# Patient Record
Sex: MALE | Race: WHITE | NOT HISPANIC OR LATINO | Employment: OTHER | URBAN - METROPOLITAN AREA
[De-identification: names, ages, dates, MRNs, and addresses within clinical notes are randomized per-mention and may not be internally consistent; named-entity substitution may affect disease eponyms.]

---

## 2023-06-02 ENCOUNTER — APPOINTMENT (OUTPATIENT)
Dept: GENERAL RADIOLOGY | Facility: HOSPITAL | Age: 73
DRG: 270 | End: 2023-06-02
Payer: MEDICARE

## 2023-06-02 ENCOUNTER — HOSPITAL ENCOUNTER (INPATIENT)
Facility: HOSPITAL | Age: 73
LOS: 1 days | Discharge: OTHER INSTITUTION | DRG: 270 | End: 2023-06-02
Attending: EMERGENCY MEDICINE | Admitting: INTERNAL MEDICINE
Payer: MEDICARE

## 2023-06-02 VITALS
DIASTOLIC BLOOD PRESSURE: 100 MMHG | WEIGHT: 206.13 LBS | SYSTOLIC BLOOD PRESSURE: 140 MMHG | HEIGHT: 72 IN | HEART RATE: 91 BPM | RESPIRATION RATE: 20 BRPM | OXYGEN SATURATION: 91 % | BODY MASS INDEX: 27.92 KG/M2 | TEMPERATURE: 97.5 F

## 2023-06-02 DIAGNOSIS — I24.9 ACS (ACUTE CORONARY SYNDROME): Primary | ICD-10-CM

## 2023-06-02 PROBLEM — R07.9 CHEST PAIN: Status: ACTIVE | Noted: 2023-06-02

## 2023-06-02 LAB
ACT BLD: 173 SECONDS (ref 89–137)
ACT BLD: 191 SECONDS (ref 89–137)
ALBUMIN SERPL-MCNC: 4.1 G/DL (ref 3.5–5.2)
ALBUMIN/GLOB SERPL: 1.2 G/DL
ALP SERPL-CCNC: 86 U/L (ref 39–117)
ALT SERPL W P-5'-P-CCNC: 15 U/L (ref 1–41)
ANION GAP SERPL CALCULATED.3IONS-SCNC: 15.1 MMOL/L (ref 5–15)
ANISOCYTOSIS BLD QL: NORMAL
APTT PPP: 25.3 SECONDS (ref 78–95.9)
APTT PPP: 26.1 SECONDS (ref 24.2–34.2)
AST SERPL-CCNC: 19 U/L (ref 1–40)
BASOPHILS # BLD AUTO: 0.07 10*3/MM3 (ref 0–0.2)
BASOPHILS NFR BLD AUTO: 0.8 % (ref 0–1.5)
BILIRUB SERPL-MCNC: 0.9 MG/DL (ref 0–1.2)
BUN SERPL-MCNC: 26 MG/DL (ref 8–23)
BUN/CREAT SERPL: 30.6 (ref 7–25)
CALCIUM SPEC-SCNC: 9.5 MG/DL (ref 8.6–10.5)
CHLORIDE SERPL-SCNC: 98 MMOL/L (ref 98–107)
CO2 SERPL-SCNC: 21.9 MMOL/L (ref 22–29)
CREAT SERPL-MCNC: 0.85 MG/DL (ref 0.76–1.27)
D-LACTATE SERPL-SCNC: 0.9 MMOL/L (ref 0.5–2)
DEPRECATED RDW RBC AUTO: 45.4 FL (ref 37–54)
EGFRCR SERPLBLD CKD-EPI 2021: 92.9 ML/MIN/1.73
EOSINOPHIL # BLD AUTO: 0.06 10*3/MM3 (ref 0–0.4)
EOSINOPHIL NFR BLD AUTO: 0.7 % (ref 0.3–6.2)
ERYTHROCYTE [DISTWIDTH] IN BLOOD BY AUTOMATED COUNT: 18.4 % (ref 12.3–15.4)
GEN 5 2HR TROPONIN T REFLEX: 142 NG/L
GLOBULIN UR ELPH-MCNC: 3.3 GM/DL
GLUCOSE BLDC GLUCOMTR-MCNC: 102 MG/DL (ref 70–99)
GLUCOSE SERPL-MCNC: 257 MG/DL (ref 65–99)
HCT VFR BLD AUTO: 41 % (ref 37.5–51)
HGB BLD-MCNC: 12.6 G/DL (ref 13–17.7)
HOLD SPECIMEN: NORMAL
HOLD SPECIMEN: NORMAL
IMM GRANULOCYTES # BLD AUTO: 0.08 10*3/MM3 (ref 0–0.05)
IMM GRANULOCYTES NFR BLD AUTO: 0.9 % (ref 0–0.5)
INR PPP: 1.01 (ref 0.86–1.15)
LYMPHOCYTES # BLD AUTO: 0.71 10*3/MM3 (ref 0.7–3.1)
LYMPHOCYTES NFR BLD AUTO: 8.1 % (ref 19.6–45.3)
MAGNESIUM SERPL-MCNC: 2.2 MG/DL (ref 1.6–2.4)
MCH RBC QN AUTO: 22.6 PG (ref 26.6–33)
MCHC RBC AUTO-ENTMCNC: 30.7 G/DL (ref 31.5–35.7)
MCV RBC AUTO: 73.6 FL (ref 79–97)
MICROCYTES BLD QL: NORMAL
MONOCYTES # BLD AUTO: 0.64 10*3/MM3 (ref 0.1–0.9)
MONOCYTES NFR BLD AUTO: 7.3 % (ref 5–12)
NEUTROPHILS NFR BLD AUTO: 7.22 10*3/MM3 (ref 1.7–7)
NEUTROPHILS NFR BLD AUTO: 82.2 % (ref 42.7–76)
NRBC BLD AUTO-RTO: 0 /100 WBC (ref 0–0.2)
NT-PROBNP SERPL-MCNC: 1339 PG/ML (ref 0–900)
PHOSPHATE SERPL-MCNC: 3.9 MG/DL (ref 2.5–4.5)
PLATELET # BLD AUTO: 350 10*3/MM3 (ref 140–450)
PMV BLD AUTO: 9.4 FL (ref 6–12)
POTASSIUM SERPL-SCNC: 4.5 MMOL/L (ref 3.5–5.2)
PROCALCITONIN SERPL-MCNC: 0.04 NG/ML (ref 0–0.25)
PROT SERPL-MCNC: 7.4 G/DL (ref 6–8.5)
PROTHROMBIN TIME: 13.4 SECONDS (ref 11.8–14.9)
RBC # BLD AUTO: 5.57 10*6/MM3 (ref 4.14–5.8)
SMALL PLATELETS BLD QL SMEAR: ADEQUATE
SODIUM SERPL-SCNC: 135 MMOL/L (ref 136–145)
TROPONIN T DELTA: 19 NG/L
TROPONIN T SERPL HS-MCNC: 123 NG/L
WBC MORPH BLD: NORMAL
WBC NRBC COR # BLD: 8.78 10*3/MM3 (ref 3.4–10.8)
WHOLE BLOOD HOLD COAG: NORMAL
WHOLE BLOOD HOLD SPECIMEN: NORMAL

## 2023-06-02 PROCEDURE — 83605 ASSAY OF LACTIC ACID: CPT | Performed by: STUDENT IN AN ORGANIZED HEALTH CARE EDUCATION/TRAINING PROGRAM

## 2023-06-02 PROCEDURE — 85610 PROTHROMBIN TIME: CPT | Performed by: EMERGENCY MEDICINE

## 2023-06-02 PROCEDURE — 85730 THROMBOPLASTIN TIME PARTIAL: CPT | Performed by: INTERNAL MEDICINE

## 2023-06-02 PROCEDURE — 93458 L HRT ARTERY/VENTRICLE ANGIO: CPT | Performed by: INTERNAL MEDICINE

## 2023-06-02 PROCEDURE — 99285 EMERGENCY DEPT VISIT HI MDM: CPT

## 2023-06-02 PROCEDURE — 93005 ELECTROCARDIOGRAM TRACING: CPT

## 2023-06-02 PROCEDURE — 25010000002 AZITHROMYCIN PER 500 MG: Performed by: STUDENT IN AN ORGANIZED HEALTH CARE EDUCATION/TRAINING PROGRAM

## 2023-06-02 PROCEDURE — B2151ZZ FLUOROSCOPY OF LEFT HEART USING LOW OSMOLAR CONTRAST: ICD-10-PCS | Performed by: INTERNAL MEDICINE

## 2023-06-02 PROCEDURE — 93010 ELECTROCARDIOGRAM REPORT: CPT | Performed by: INTERNAL MEDICINE

## 2023-06-02 PROCEDURE — 87798 DETECT AGENT NOS DNA AMP: CPT

## 2023-06-02 PROCEDURE — 99153 MOD SED SAME PHYS/QHP EA: CPT | Performed by: INTERNAL MEDICINE

## 2023-06-02 PROCEDURE — 85730 THROMBOPLASTIN TIME PARTIAL: CPT | Performed by: EMERGENCY MEDICINE

## 2023-06-02 PROCEDURE — 84145 PROCALCITONIN (PCT): CPT | Performed by: STUDENT IN AN ORGANIZED HEALTH CARE EDUCATION/TRAINING PROGRAM

## 2023-06-02 PROCEDURE — C1894 INTRO/SHEATH, NON-LASER: HCPCS | Performed by: INTERNAL MEDICINE

## 2023-06-02 PROCEDURE — 87633 RESP VIRUS 12-25 TARGETS: CPT

## 2023-06-02 PROCEDURE — 82948 REAGENT STRIP/BLOOD GLUCOSE: CPT

## 2023-06-02 PROCEDURE — 25510000001 IOPAMIDOL PER 1 ML: Performed by: INTERNAL MEDICINE

## 2023-06-02 PROCEDURE — 84100 ASSAY OF PHOSPHORUS: CPT | Performed by: PHYSICIAN ASSISTANT

## 2023-06-02 PROCEDURE — 71045 X-RAY EXAM CHEST 1 VIEW: CPT

## 2023-06-02 PROCEDURE — 25010000002 MIDAZOLAM PER 1MG: Performed by: INTERNAL MEDICINE

## 2023-06-02 PROCEDURE — 4A023N7 MEASUREMENT OF CARDIAC SAMPLING AND PRESSURE, LEFT HEART, PERCUTANEOUS APPROACH: ICD-10-PCS | Performed by: INTERNAL MEDICINE

## 2023-06-02 PROCEDURE — 99223 1ST HOSP IP/OBS HIGH 75: CPT | Performed by: STUDENT IN AN ORGANIZED HEALTH CARE EDUCATION/TRAINING PROGRAM

## 2023-06-02 PROCEDURE — 33967 INSERT I-AORT PERCUT DEVICE: CPT | Performed by: INTERNAL MEDICINE

## 2023-06-02 PROCEDURE — 5A02210 ASSISTANCE WITH CARDIAC OUTPUT USING BALLOON PUMP, CONTINUOUS: ICD-10-PCS | Performed by: INTERNAL MEDICINE

## 2023-06-02 PROCEDURE — 80053 COMPREHEN METABOLIC PANEL: CPT | Performed by: EMERGENCY MEDICINE

## 2023-06-02 PROCEDURE — 85347 COAGULATION TIME ACTIVATED: CPT

## 2023-06-02 PROCEDURE — 25010000002 FENTANYL CITRATE (PF) 100 MCG/2ML SOLUTION: Performed by: INTERNAL MEDICINE

## 2023-06-02 PROCEDURE — 83880 ASSAY OF NATRIURETIC PEPTIDE: CPT | Performed by: EMERGENCY MEDICINE

## 2023-06-02 PROCEDURE — 85025 COMPLETE CBC W/AUTO DIFF WBC: CPT | Performed by: EMERGENCY MEDICINE

## 2023-06-02 PROCEDURE — 84484 ASSAY OF TROPONIN QUANT: CPT | Performed by: EMERGENCY MEDICINE

## 2023-06-02 PROCEDURE — 85007 BL SMEAR W/DIFF WBC COUNT: CPT | Performed by: EMERGENCY MEDICINE

## 2023-06-02 PROCEDURE — 93005 ELECTROCARDIOGRAM TRACING: CPT | Performed by: EMERGENCY MEDICINE

## 2023-06-02 PROCEDURE — 87581 M.PNEUMON DNA AMP PROBE: CPT

## 2023-06-02 PROCEDURE — 36415 COLL VENOUS BLD VENIPUNCTURE: CPT

## 2023-06-02 PROCEDURE — 25010000002 CEFTRIAXONE PER 250 MG: Performed by: STUDENT IN AN ORGANIZED HEALTH CARE EDUCATION/TRAINING PROGRAM

## 2023-06-02 PROCEDURE — 25010000002 HEPARIN (PORCINE) PER 1000 UNITS: Performed by: INTERNAL MEDICINE

## 2023-06-02 PROCEDURE — C1769 GUIDE WIRE: HCPCS | Performed by: INTERNAL MEDICINE

## 2023-06-02 PROCEDURE — 87486 CHLMYD PNEUM DNA AMP PROBE: CPT | Performed by: STUDENT IN AN ORGANIZED HEALTH CARE EDUCATION/TRAINING PROGRAM

## 2023-06-02 PROCEDURE — B2111ZZ FLUOROSCOPY OF MULTIPLE CORONARY ARTERIES USING LOW OSMOLAR CONTRAST: ICD-10-PCS | Performed by: INTERNAL MEDICINE

## 2023-06-02 PROCEDURE — 99152 MOD SED SAME PHYS/QHP 5/>YRS: CPT | Performed by: INTERNAL MEDICINE

## 2023-06-02 PROCEDURE — 83735 ASSAY OF MAGNESIUM: CPT | Performed by: PHYSICIAN ASSISTANT

## 2023-06-02 RX ORDER — NITROGLYCERIN 0.4 MG/1
0.4 TABLET SUBLINGUAL
Status: DISCONTINUED | OUTPATIENT
Start: 2023-06-02 | End: 2023-06-03 | Stop reason: HOSPADM

## 2023-06-02 RX ORDER — LOSARTAN POTASSIUM AND HYDROCHLOROTHIAZIDE 12.5; 1 MG/1; MG/1
1 TABLET ORAL DAILY
COMMUNITY
Start: 2023-05-20

## 2023-06-02 RX ORDER — SODIUM CHLORIDE 0.9 % (FLUSH) 0.9 %
10 SYRINGE (ML) INJECTION EVERY 12 HOURS SCHEDULED
Status: DISCONTINUED | OUTPATIENT
Start: 2023-06-02 | End: 2023-06-03 | Stop reason: HOSPADM

## 2023-06-02 RX ORDER — GLIPIZIDE 10 MG/1
10 TABLET ORAL
COMMUNITY

## 2023-06-02 RX ORDER — AMOXICILLIN 250 MG
2 CAPSULE ORAL 2 TIMES DAILY
Status: DISCONTINUED | OUTPATIENT
Start: 2023-06-02 | End: 2023-06-03 | Stop reason: HOSPADM

## 2023-06-02 RX ORDER — SODIUM CHLORIDE 0.9 % (FLUSH) 0.9 %
10 SYRINGE (ML) INJECTION AS NEEDED
Status: DISCONTINUED | OUTPATIENT
Start: 2023-06-02 | End: 2023-06-03 | Stop reason: HOSPADM

## 2023-06-02 RX ORDER — FENTANYL CITRATE 50 UG/ML
INJECTION, SOLUTION INTRAMUSCULAR; INTRAVENOUS
Status: DISCONTINUED | OUTPATIENT
Start: 2023-06-02 | End: 2023-06-02 | Stop reason: HOSPADM

## 2023-06-02 RX ORDER — DAPAGLIFLOZIN 10 MG/1
1 TABLET, FILM COATED ORAL DAILY
COMMUNITY
Start: 2023-05-22

## 2023-06-02 RX ORDER — SODIUM CHLORIDE 9 MG/ML
INJECTION, SOLUTION INTRAVENOUS
Status: COMPLETED | OUTPATIENT
Start: 2023-06-02 | End: 2023-06-02

## 2023-06-02 RX ORDER — TAMSULOSIN HYDROCHLORIDE 0.4 MG/1
0.4 CAPSULE ORAL DAILY
Status: DISCONTINUED | OUTPATIENT
Start: 2023-06-02 | End: 2023-06-03 | Stop reason: HOSPADM

## 2023-06-02 RX ORDER — ROSUVASTATIN CALCIUM 20 MG/1
40 TABLET, COATED ORAL NIGHTLY
Status: DISCONTINUED | OUTPATIENT
Start: 2023-06-02 | End: 2023-06-03 | Stop reason: HOSPADM

## 2023-06-02 RX ORDER — VERAPAMIL HYDROCHLORIDE 2.5 MG/ML
INJECTION, SOLUTION INTRAVENOUS
Status: DISCONTINUED | OUTPATIENT
Start: 2023-06-02 | End: 2023-06-02 | Stop reason: HOSPADM

## 2023-06-02 RX ORDER — AMLODIPINE BESYLATE 5 MG/1
5 TABLET ORAL DAILY
COMMUNITY

## 2023-06-02 RX ORDER — FAMOTIDINE 10 MG/ML
20 INJECTION, SOLUTION INTRAVENOUS ONCE
Status: COMPLETED | OUTPATIENT
Start: 2023-06-02 | End: 2023-06-02

## 2023-06-02 RX ORDER — POLYETHYLENE GLYCOL 3350 17 G/17G
17 POWDER, FOR SOLUTION ORAL DAILY PRN
Status: DISCONTINUED | OUTPATIENT
Start: 2023-06-02 | End: 2023-06-03 | Stop reason: HOSPADM

## 2023-06-02 RX ORDER — CEFTRIAXONE SODIUM 1 G/50ML
1 INJECTION, SOLUTION INTRAVENOUS EVERY 24 HOURS
Status: DISCONTINUED | OUTPATIENT
Start: 2023-06-02 | End: 2023-06-03 | Stop reason: HOSPADM

## 2023-06-02 RX ORDER — ACETAMINOPHEN 325 MG/1
650 TABLET ORAL EVERY 4 HOURS PRN
Status: DISCONTINUED | OUTPATIENT
Start: 2023-06-02 | End: 2023-06-03 | Stop reason: HOSPADM

## 2023-06-02 RX ORDER — ONDANSETRON 4 MG/1
4 TABLET, FILM COATED ORAL EVERY 6 HOURS PRN
Status: DISCONTINUED | OUTPATIENT
Start: 2023-06-02 | End: 2023-06-03 | Stop reason: HOSPADM

## 2023-06-02 RX ORDER — METOPROLOL SUCCINATE 25 MG/1
25 TABLET, EXTENDED RELEASE ORAL
Status: DISCONTINUED | OUTPATIENT
Start: 2023-06-02 | End: 2023-06-03 | Stop reason: HOSPADM

## 2023-06-02 RX ORDER — HEPARIN SOD,PORCINE/0.9 % NACL 25000/250
12 INTRAVENOUS SOLUTION INTRAVENOUS
Status: DISCONTINUED | OUTPATIENT
Start: 2023-06-02 | End: 2023-06-02

## 2023-06-02 RX ORDER — ASPIRIN 81 MG/1
81 TABLET ORAL DAILY
Status: DISCONTINUED | OUTPATIENT
Start: 2023-06-02 | End: 2023-06-03 | Stop reason: HOSPADM

## 2023-06-02 RX ORDER — ENOXAPARIN SODIUM 100 MG/ML
40 INJECTION SUBCUTANEOUS NIGHTLY
Status: DISCONTINUED | OUTPATIENT
Start: 2023-06-02 | End: 2023-06-02 | Stop reason: SDUPTHER

## 2023-06-02 RX ORDER — CALCIUM CARBONATE 500 MG/1
1 TABLET, CHEWABLE ORAL 2 TIMES DAILY PRN
Status: DISCONTINUED | OUTPATIENT
Start: 2023-06-02 | End: 2023-06-03 | Stop reason: HOSPADM

## 2023-06-02 RX ORDER — TAMSULOSIN HYDROCHLORIDE 0.4 MG/1
1 CAPSULE ORAL DAILY
COMMUNITY

## 2023-06-02 RX ORDER — LIDOCAINE HYDROCHLORIDE 20 MG/ML
INJECTION, SOLUTION INFILTRATION; PERINEURAL
Status: DISCONTINUED | OUTPATIENT
Start: 2023-06-02 | End: 2023-06-02 | Stop reason: HOSPADM

## 2023-06-02 RX ORDER — SODIUM CHLORIDE 9 MG/ML
40 INJECTION, SOLUTION INTRAVENOUS AS NEEDED
Status: DISCONTINUED | OUTPATIENT
Start: 2023-06-02 | End: 2023-06-03 | Stop reason: HOSPADM

## 2023-06-02 RX ORDER — BISACODYL 5 MG/1
5 TABLET, DELAYED RELEASE ORAL DAILY PRN
Status: DISCONTINUED | OUTPATIENT
Start: 2023-06-02 | End: 2023-06-03 | Stop reason: HOSPADM

## 2023-06-02 RX ORDER — HEPARIN SODIUM 1000 [USP'U]/ML
INJECTION, SOLUTION INTRAVENOUS; SUBCUTANEOUS
Status: DISCONTINUED | OUTPATIENT
Start: 2023-06-02 | End: 2023-06-02 | Stop reason: HOSPADM

## 2023-06-02 RX ORDER — MIDAZOLAM HYDROCHLORIDE 2 MG/2ML
INJECTION, SOLUTION INTRAMUSCULAR; INTRAVENOUS
Status: DISCONTINUED | OUTPATIENT
Start: 2023-06-02 | End: 2023-06-02 | Stop reason: HOSPADM

## 2023-06-02 RX ORDER — ACETAMINOPHEN 325 MG/1
650 TABLET ORAL EVERY 4 HOURS PRN
Status: DISCONTINUED | OUTPATIENT
Start: 2023-06-02 | End: 2023-06-02 | Stop reason: SDUPTHER

## 2023-06-02 RX ORDER — HEPARIN SOD,PORCINE/0.9 % NACL 25000/250
12 INTRAVENOUS SOLUTION INTRAVENOUS
Status: DISCONTINUED | OUTPATIENT
Start: 2023-06-02 | End: 2023-06-03 | Stop reason: HOSPADM

## 2023-06-02 RX ORDER — BISACODYL 10 MG
10 SUPPOSITORY, RECTAL RECTAL DAILY PRN
Status: DISCONTINUED | OUTPATIENT
Start: 2023-06-02 | End: 2023-06-03 | Stop reason: HOSPADM

## 2023-06-02 RX ADMIN — FAMOTIDINE 20 MG: 10 INJECTION INTRAVENOUS at 22:20

## 2023-06-02 RX ADMIN — Medication 10 ML: at 21:48

## 2023-06-02 RX ADMIN — SODIUM CHLORIDE 500 MG: 9 INJECTION, SOLUTION INTRAVENOUS at 17:58

## 2023-06-02 RX ADMIN — CEFTRIAXONE SODIUM 1 G: 1 INJECTION, SOLUTION INTRAVENOUS at 17:58

## 2023-06-02 RX ADMIN — HEPARIN SODIUM 12 UNITS/KG/HR: 5000 INJECTION INTRAVENOUS; SUBCUTANEOUS at 22:14

## 2023-06-02 NOTE — H&P
[facility]   HOSPITALIST HISTORY AND PHYSICAL / DISCHARGE   Date: 2023   Patient Name: Woodrow Garcia  : 1950  MRN: [id number]  Primary Care Physician:  Terence Whitaker MD  Date of admission: 2023    Subjective   Subjective   CC:   Shortness of breath    HPI:    Woodrow Garcia is a 73 y.o. male a past medical history of diabetes, hypertension.  Patient presents the emergency department with 3 weeks of worsening shortness of breath.  Patient went to his primary care physician's office today, PCP concerned having an acute MI after looking at an EKG, therefore sent to ED via ambulance.  In the emergency department patient noted had elevated troponin given symptoms and lab findings patient was taken immediately to the Cath Lab.  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  Patient is being transferred to [facility] in [facility] for additional work-up.  Patient came out of the Cath Lab at [facility] with a balloon pump.  Patient in stable condition prior to discharge    Personal History     Past Medical History:  Diabetes  Hypertension    Past Surgical History:  No surgical Hx    Family History:   Father with cardiac disease, passed in his 80s  Mother alive with Alzheimers     Social History:   Never smoker  Non drinker  No other drug use     Home Medications:  amLODIPine, dapagliflozin Propanediol, glipizide, losartan-hydrochlorothiazide, metFORMIN, and tamsulosin    Allergies:  No Known Allergies    Review of Systems   All systems were reviewed and negative except for: denies any chest pain and no dyspnea     Objective   Objective     Vitals:   Temp:  [98.2 °F (36.8 °C)] 98.2 °F (36.8 °C)  Heart Rate:  [104-124] 104  Resp:  [28] 28  BP: (119-140)/(72-95) 119/72  Flow (L/min):  [2] 2    Physical Exam    Constitutional: Awake, alert, no acute distress   Eyes: Pupils equal, sclerae anicteric, no conjunctival injection   HENT: NCAT, mucous membranes  moist   Neck: Supple, no thyromegaly, no lymphadenopathy, trachea midline   Respiratory: Clear to auscultation bilaterally, nonlabored respirations    Cardiovascular: RRR, systolic murmurs, rubs, or gallops, palpable pedal pulses bilaterally   Gastrointestinal: Positive bowel sounds, soft, nontender, nondistended   Musculoskeletal: No bilateral ankle edema, no clubbing or cyanosis to extremities    Neurologic: Oriented x 3, strength symmetric in all extremities, Cranial Nerves grossly intact to confrontation, speech clear    Result Review    Result Review:  I have personally reviewed the results from the time of this admission to 6/2/2023 14:18 EDT and agree with these findings:  []  Laboratory  []  Microbiology  []  Radiology  []  EKG/Telemetry   []  Cardiology/Vascular   []  Pathology  []  Old records  []  Other:      Assessment & Plan   Assessment / Plan     Assessment/Plan:  NSTEMI  LAD CAD on cath   Hypertension  Diabetes    Plan:  Patient was taken to the Cath Lab with cardiology, found to have left main disease and therefore will need to be considered for CABG.  Balloon pump was placed and patient was post procedurally taken to the ICU for close monitoring.  Patient will be transferred to [facility] in the [place], [place] for definitive therapy, CABG.  Dr. Wright is the cardiologist aware.   is accepting physician in the ICU.      DVT prophylaxis:  Medical DVT prophylaxis orders are present.    CODE STATUS:    Code Status (Patient has no pulse and is not breathing): CPR (Attempt to Resuscitate)  Medical Interventions (Patient has pulse or is breathing): Full Support    Electronically signed by Ned Monzon MD, 06/02/23, 2:18 PM EDT.

## 2023-06-02 NOTE — CONSULTS
[facility]   Cardiology Consult Note    Patient Name: Woodrow Garcia  : 1950  MRN: [id number]  Primary Care Physician:  Terence Whitaker MD  Referring Physician: No ref. provider found  Date of admission: 2023    Subjective   Subjective     Reason for Consult/ Chief Complaint: Shortness of breath    HPI:  Woodrow Garcia is a 73 y.o. male with past medical history significant for hypertension and diabetes, was sent to the emergency room by his primary care provider with abnormal ECG.  The patient has been having progressive shortness of breath which started about 3 weeks ago.  Lately, he is unable to walk more than 100 feet without having to stop due to shortness of breath and general fatigue.  He denies chest discomfort per se.  He has no palpitations, dizziness, presyncope or syncope.  He has no fever, chills or cough.  He is normally able to hike and walk a far distance without stopping.  He has no previous cardiac history.  He was noted to have abnormal ECG concerning for myocardial ischemia.  He has a rising troponin consistent with non-STEMI.  We were called for further evaluation and treatment.    Review of Systems   All systems were reviewed and negative except as above.      Personal History     Past Medical History:   Diagnosis Date   • Diabetes    • Enlarged prostate    • Hypertension         Family History: family history is not on file. Otherwise pertinent FHx was reviewed and not pertinent to current issue.    Social History:  reports that he has never smoked. He does not have any smokeless tobacco history on file. He reports that he does not drink alcohol and does not use drugs.    Home Medications:  amLODIPine, dapagliflozin Propanediol, glipizide, losartan-hydrochlorothiazide, metFORMIN, and tamsulosin    Allergies:  No Known Allergies    Objective    Objective     Vitals:   Temp:  [98.2 °F (36.8 °C)] 98.2 °F (36.8 °C)  Heart Rate:  [104-124] 104  Resp:  [28] 28  BP:  (119-140)/(72-95) 119/72  Flow (L/min):  [2] 2      Physical Exam:   Constitutional: Awake, alert, No acute distress    Eyes: PERRLA, sclerae anicteric, no conjunctival injection   HENT: NCAT, mucous membranes moist   Neck: Supple, no thyromegaly, no lymphadenopathy, trachea midline   Respiratory: Clear to auscultation bilaterally, nonlabored respirations    Cardiovascular: RRR, no murmurs, rubs, or gallops, palpable pedal pulses bilaterally   Gastrointestinal: Positive bowel sounds, soft, nontender, nondistended   Musculoskeletal: No bilateral ankle edema, no clubbing or cyanosis to extremities   Psychiatric: Appropriate affect, cooperative   Neurologic: Oriented x 3, strength symmetric in all extremities, Cranial Nerves grossly intact to confrontation, speech clear   Skin: No rashes     Result Review    Result Review:  I have personally reviewed the results from the time of this admission to 6/2/2023 13:56 EDT and agree with these findings:  [x]  Laboratory  []  Microbiology  [x]  Radiology  [x]  EKG/Telemetry   [x]  Cardiology/Vascular   []  Pathology  [x]  Old records  []  Other:  Most notable findings include:     CMP        6/2/2023    09:44   CMP   Glucose 257     BUN 26     Creatinine 0.85     EGFR 92.9     Sodium 135     Potassium 4.5     Chloride 98     Calcium 9.5     Total Protein 7.4     Albumin 4.1     Globulin 3.3     Total Bilirubin 0.9     Alkaline Phosphatase 86     AST (SGOT) 19     ALT (SGPT) 15     Albumin/Globulin Ratio 1.2     BUN/Creatinine Ratio 30.6     Anion Gap 15.1        CBC        6/2/2023    09:44   CBC   WBC 8.78     RBC 5.57     Hemoglobin 12.6     Hematocrit 41.0     MCV 73.6     MCH 22.6     MCHC 30.7     RDW 18.4     Platelets 350        Lab Results   Component Value Date    TROPONINT 142 (C) 06/02/2023         Assessment & Plan   Assessment / Plan     Brief Patient Summary:  Woodrow Garcia is a 73 y.o. male with:    -Progressive exertional dyspnea and exercise intolerance with  abnormal ECG and elevated troponin consistent with non-STEMI  -Hypertension  -Diabetes    Plan:   We will proceed with cardiac catheterization and possible intervention.  Risks versus benefits of the procedure were discussed with the patient and he was agreeable to proceed  Resume home medications for hypertension and diabetes  Further recommendations to follow    Thank you for the consultation    Electronically signed by Pito Brannon MD, 06/02/23, 1:56 PM EDT.

## 2023-06-02 NOTE — Clinical Note
The DP pulses are +2 bilaterally. The PT pulses are +2 bilaterally. The right radial pulse is +2. The femoral pulses are +2 bilaterally.

## 2023-06-02 NOTE — CONSULTS
Pulmonary / Critical Care Consult Note      Patient Name: Woodrow Garcia  : 1950  MRN: [id number]  Primary Care Physician:  Terence Whitaker MD  Referring Physician: Ned Monzon MD  Date of admission: 2023    Subjective   Subjective     Reason for Consult/ Chief Complaint:   Cardiogenic shock    HPI:  Woodrow Garcia is a 73 y.o. male with history of diabetes, hypertension presented to the ED with shortness of breath and chest discomfort.  In the ED patient was found to have elevated troponin and cardiology team took patient to the Cath Lab for evaluation.  Patient was found to have left main disease and will need CABG.  A balloon pump was placed preemptively by Dr. Brannon with the intent of patient being transferred to [facility] in [facility] for additional work-up and intervention.  In the ICU patient is hemodynamically stable.  He is on 4 L nasal cannula with SPO2 in the low 90s.  Patient denies smoking, tobacco use, drug use ever.  He reports being relatively healthy about a month ago where he was able to do hiking with his wife.  For the last week or so he can only walk up to 100 feet or so without getting shortness of breath.    Review of Systems  Constitutional symptoms:  Denied complaints   Ear, nose, throat: Denied complaints  Cardiovascular:  Denied complaints  Respiratory: +shortness of breath; +dyspnea on exertion; Denied complaints  Gastrointestinal: Denied complaints  Musculoskeletal: Denied complaints  Genitourinary: Denied complaints  Allergy / Immunology: Denied complaints  Hematologic: Denied complaints  Neurologic: Denied complaints  Skin: Denied complaints  Endocrine: Denied complaints  Psychiatric: Denied complaints      Personal History     Past Medical History:   Diagnosis Date   • Diabetes    • Enlarged prostate    • Hypertension        History reviewed. No pertinent surgical history.    Family History: family history is not on file. Otherwise pertinent FHx  Recorded as Task  Date: 05/15/2018 02:00 PM, Created By: TYRA TOM  Task Name: Nurse Order  Assigned To: Lindsey Gomez  Regarding Patient: PHILOMENA CELIS, Status: Active  Comment:   TYRA TOM - 15 May 2018 2:00 PM    TASK CREATED    please call patient.  She told me yesterday that her diet RC cola did not have any caffeine in it.  I looked it up after she left.  It has an excessive amount of caffeine, nearly as much as mountain dew.  She needs to stop drinking soda, tea, diet soda, etc.   water should be her beverage of choice.     She is having a lot of difficulty with limb movement/restless legs and this will help significantly.       Thanks.  Lindsey Lund - 15 May 2018 2:57 PM    TASK EDITED  Pt notified of orders and verbalized understanding.      Electronically signed by:Lindsey Gomez R.N.  May 15 2018  2:57PM CST     was reviewed and not pertinent to current issue.    Social History:  reports that he has never smoked. He does not have any smokeless tobacco history on file. He reports that he does not drink alcohol and does not use drugs.    Home Medications:  amLODIPine, dapagliflozin Propanediol, glipizide, losartan-hydrochlorothiazide, metFORMIN, and tamsulosin    Allergies:  No Known Allergies    Objective    Objective     Vitals:   Temp:  [98.2 °F (36.8 °C)] 98.2 °F (36.8 °C)  Heart Rate:  [104-124] 104  Resp:  [28] 28  BP: (119-140)/(72-95) 119/72  Flow (L/min):  [2] 2    Physical Exam:  Vital Signs Reviewed   General:  WDWN, Alert, NAD. Lying in bed   HEENT:  PERRL, EOMI.  OP, nares clear  Neck:  Supple, no JVD, no thyromegaly  Chest:  clear to auscultation bilaterally, no work of breathing noted on 4L NC  CV: RRR, no MGR, pulses 2+, equal.  Abd:  Soft, NT, ND, + BS, no HSM  EXT:  no clubbing, no cyanosis, no edema  Neuro:  A&Ox3, CN grossly intact, no focal deficits.  Skin: No rashes or lesions noted      Result Review    Result Review:  I have personally reviewed the results from the time of this admission to 6/2/2023 16:49 EDT and agree with these findings:  []  Laboratory  []  Microbiology  []  Radiology  []  EKG/Telemetry   []  Cardiology/Vascular   []  Pathology  []  Old records  []  Other:  Most notable findings include:   Troponin 123  proBNP 1339  Creatinine 0.89    Assessment & Plan   Assessment / Plan     Active Hospital Problems:  Active Hospital Problems    Diagnosis    • **Chest pain      Impression:  Severe CAD   NSTEMI  Balloon pump placement  Hypoxic respiratory failure requiring oxygen supplementation  Multifocal pneumonia, unspecified organism  History of hypertension  History of diabetes    Plan:  -Currently on 4 L nasal cannula continue to wean as tolerated keep SPO2 greater 90%;   -Chest x-ray showed right-sided opacification concerning for pneumonia; given hypoxia, will treat for now; will check  Pro-Wai, respiratory viral panel, lactic acid  -Right femoral balloon pump in place, management per cardiology  -Continue aspirin, statin, beta-blocker per cardiology   -Cont aspiration precautions  -Replace electrolytes PRN to keep K 4.0, Mag 2.0, Phos 4.0.  -Keep glucose 140-180 while in ICU. Cont SSI.  -DVT ppx: Lovenox  -GI ppx: None needed at this time  -Lines: PIVs  -Pending transfer to [facility] for higher level care    DVT prophylaxis:  Medical DVT prophylaxis orders are present.     Code Status and Medical Interventions:   Ordered at: 06/02/23 1418     Code Status (Patient has no pulse and is not breathing):    CPR (Attempt to Resuscitate)     Medical Interventions (Patient has pulse or is breathing):    Full Support      Electronically signed by Neisha Salas MD, 06/02/23, 4:49 PM EDT.

## 2023-06-02 NOTE — Clinical Note
IABP inserted in the right groin. suture and transparent dressing used to secure. IABP verified with fluoroscopy.Ratio: 1:1.Augmented pressure (mmHg): 113.

## 2023-06-02 NOTE — PLAN OF CARE
Goal Outcome Evaluation:      Goals of care discussed with: Patient and family    Progress: Ongoing, progressing    Outcome: Pt is A&Ox4, Sinus Tach 120's, NC @ 4L (SPO2@ 91%), pt to be tx to [facility] for CABG. IABP functioning properly at a 1:1 ratio. Incision sites WNL. Pulses palpable.

## 2023-06-02 NOTE — H&P (VIEW-ONLY)
[facility]   Cardiology Consult Note    Patient Name: Woodrow Garcia  : 1950  MRN: [id number]  Primary Care Physician:  Terence Whitaker MD  Referring Physician: No ref. provider found  Date of admission: 2023    Subjective   Subjective     Reason for Consult/ Chief Complaint: Shortness of breath    HPI:  Woodrow Garcia is a 73 y.o. male with past medical history significant for hypertension and diabetes, was sent to the emergency room by his primary care provider with abnormal ECG.  The patient has been having progressive shortness of breath which started about 3 weeks ago.  Lately, he is unable to walk more than 100 feet without having to stop due to shortness of breath and general fatigue.  He denies chest discomfort per se.  He has no palpitations, dizziness, presyncope or syncope.  He has no fever, chills or cough.  He is normally able to hike and walk a far distance without stopping.  He has no previous cardiac history.  He was noted to have abnormal ECG concerning for myocardial ischemia.  He has a rising troponin consistent with non-STEMI.  We were called for further evaluation and treatment.    Review of Systems   All systems were reviewed and negative except as above.      Personal History     Past Medical History:   Diagnosis Date   • Diabetes    • Enlarged prostate    • Hypertension         Family History: family history is not on file. Otherwise pertinent FHx was reviewed and not pertinent to current issue.    Social History:  reports that he has never smoked. He does not have any smokeless tobacco history on file. He reports that he does not drink alcohol and does not use drugs.    Home Medications:  amLODIPine, dapagliflozin Propanediol, glipizide, losartan-hydrochlorothiazide, metFORMIN, and tamsulosin    Allergies:  No Known Allergies    Objective    Objective     Vitals:   Temp:  [98.2 °F (36.8 °C)] 98.2 °F (36.8 °C)  Heart Rate:  [104-124] 104  Resp:  [28] 28  BP:  (119-140)/(72-95) 119/72  Flow (L/min):  [2] 2      Physical Exam:   Constitutional: Awake, alert, No acute distress    Eyes: PERRLA, sclerae anicteric, no conjunctival injection   HENT: NCAT, mucous membranes moist   Neck: Supple, no thyromegaly, no lymphadenopathy, trachea midline   Respiratory: Clear to auscultation bilaterally, nonlabored respirations    Cardiovascular: RRR, no murmurs, rubs, or gallops, palpable pedal pulses bilaterally   Gastrointestinal: Positive bowel sounds, soft, nontender, nondistended   Musculoskeletal: No bilateral ankle edema, no clubbing or cyanosis to extremities   Psychiatric: Appropriate affect, cooperative   Neurologic: Oriented x 3, strength symmetric in all extremities, Cranial Nerves grossly intact to confrontation, speech clear   Skin: No rashes     Result Review    Result Review:  I have personally reviewed the results from the time of this admission to 6/2/2023 13:56 EDT and agree with these findings:  [x]  Laboratory  []  Microbiology  [x]  Radiology  [x]  EKG/Telemetry   [x]  Cardiology/Vascular   []  Pathology  [x]  Old records  []  Other:  Most notable findings include:     CMP        6/2/2023    09:44   CMP   Glucose 257     BUN 26     Creatinine 0.85     EGFR 92.9     Sodium 135     Potassium 4.5     Chloride 98     Calcium 9.5     Total Protein 7.4     Albumin 4.1     Globulin 3.3     Total Bilirubin 0.9     Alkaline Phosphatase 86     AST (SGOT) 19     ALT (SGPT) 15     Albumin/Globulin Ratio 1.2     BUN/Creatinine Ratio 30.6     Anion Gap 15.1        CBC        6/2/2023    09:44   CBC   WBC 8.78     RBC 5.57     Hemoglobin 12.6     Hematocrit 41.0     MCV 73.6     MCH 22.6     MCHC 30.7     RDW 18.4     Platelets 350        Lab Results   Component Value Date    TROPONINT 142 (C) 06/02/2023         Assessment & Plan   Assessment / Plan     Brief Patient Summary:  Woodrow Garcia is a 73 y.o. male with:    -Progressive exertional dyspnea and exercise intolerance with  abnormal ECG and elevated troponin consistent with non-STEMI  -Hypertension  -Diabetes    Plan:   We will proceed with cardiac catheterization and possible intervention.  Risks versus benefits of the procedure were discussed with the patient and he was agreeable to proceed  Resume home medications for hypertension and diabetes  Further recommendations to follow    Thank you for the consultation    Electronically signed by Pito Brannon MD, 06/02/23, 1:56 PM EDT.

## 2023-06-02 NOTE — ED PROVIDER NOTES
Time: 11:11 AM EDT  Date of encounter:  6/2/2023  Independent Historian/Clinical History and Information was obtained by:   Patient  Chief Complaint: SOB    History is limited by: N/A    History of Present Illness:  Patient is a 73 y.o. year old male who presents to the emergency department for evaluation of SOB. About 3 weeks ago he started getting SOB very easily. Today he went to see his PCP regarding his shortness of air with exertion, and his PCP said he is having an acute MI after a concerning EKG. patient does admit to some tightness in his chest when he gets short of air.  He has had high blood pressure for 3 years or so, and has been seeing the PCP every 6 months to check up on his heart. He denies any pain in chest or elsewhere. He is not on oxygen at home. He states that he can only walk about 100 feet without having SOB.  This and is an acute change in his exercise capacity.    HPI    Patient Care Team  Primary Care Provider: Terence Whitaker MD    Past Medical History:     No Known Allergies  Past Medical History:   Diagnosis Date    Diabetes     Enlarged prostate     Hypertension      History reviewed. No pertinent surgical history.  History reviewed. No pertinent family history.    Home Medications:  Prior to Admission medications    Medication Sig Start Date End Date Taking? Authorizing Provider   amLODIPine (NORVASC) 5 MG tablet Take 1 tablet by mouth Daily.    ProviderLatanya MD   dapagliflozin-metformin HCl ER (XIGDUO XR)  MG tablet Take 1 tablet by mouth Daily.    Latanya Bejarano MD   glipizide (GLUCOTROL) 10 MG tablet Take 1 tablet by mouth 2 (Two) Times a Day Before Meals.    Latanya Bejarano MD   losartan 100 MG tablet 100 mg, amLODIPine 10 MG tablet 10 mg, hydroCHLOROthiazide 12.5 MG 12.5 mg Take  by mouth.    Latanya Bejarano MD   metFORMIN (GLUCOPHAGE) 1000 MG tablet Take 1 tablet by mouth 2 (Two) Times a Day With Meals.    Latanya Bejarano MD   tamsulosin  "(FLOMAX) 0.4 MG capsule 24 hr capsule Take 1 capsule by mouth Daily.    Provider, Historical, MD        Social History:   Social History     Tobacco Use    Smoking status: Never     Passive exposure: Never    Smokeless tobacco: Never   Vaping Use    Vaping Use: Never used   Substance Use Topics    Alcohol use: Never    Drug use: Never         Review of Systems:  Review of Systems   Constitutional:  Negative for chills and fever.   HENT:  Negative for congestion, ear pain and sore throat.    Eyes:  Negative for pain.   Respiratory:  Positive for shortness of breath. Negative for cough and chest tightness.    Cardiovascular:  Negative for chest pain.   Gastrointestinal:  Negative for abdominal pain, diarrhea, nausea and vomiting.   Genitourinary:  Negative for flank pain and hematuria.   Musculoskeletal:  Negative for joint swelling.   Skin:  Negative for pallor.   Neurological:  Negative for seizures and headaches.   All other systems reviewed and are negative.     Physical Exam:  /100   Pulse 91   Temp 97.5 °F (36.4 °C) (Oral)   Resp 20   Ht 182.9 cm (72\")   Wt 93.5 kg (206 lb 2.1 oz)   SpO2 91%   BMI 27.96 kg/m²     Physical Exam  Vitals and nursing note reviewed.   Constitutional:       General: He is not in acute distress.     Appearance: Normal appearance. He is not toxic-appearing.   HENT:      Head: Normocephalic and atraumatic.      Mouth/Throat:      Mouth: Mucous membranes are moist.   Eyes:      General: No scleral icterus.  Cardiovascular:      Rate and Rhythm: Regular rhythm. Tachycardia present.      Pulses: Normal pulses.      Heart sounds: Normal heart sounds.   Pulmonary:      Effort: Pulmonary effort is normal. No respiratory distress.      Breath sounds: Normal breath sounds.   Abdominal:      General: Abdomen is flat.      Palpations: Abdomen is soft.      Tenderness: There is no abdominal tenderness.   Musculoskeletal:         General: Normal range of motion.      Cervical back: " Normal range of motion and neck supple.   Skin:     General: Skin is warm and dry.   Neurological:      Mental Status: He is alert and oriented to person, place, and time. Mental status is at baseline.                Procedures:  Procedures      Medical Decision Making:      Comorbidities that affect care:    Diabetes, Hypertension    External Notes reviewed:    Previous Clinic Note: Office visit from his primary care provider from earlier today was reviewed by me.      The following orders were placed and all results were independently analyzed by me:  Orders Placed This Encounter   Procedures    Respiratory Panel, PCR (WITHOUT COVID) - Swab, Nasopharynx    XR Chest 1 View    [place] Draw    Comprehensive Metabolic Panel    BNP    Single High Sensitivity Troponin T    CBC Auto Differential    Scan Slide    High Sensitivity Troponin T 2Hr    Protime-INR    aPTT    Procalcitonin    Lactic Acid, Plasma    Magnesium    Phosphorus    aPTT    Undress & Gown    Vital Signs    Please Call and Notify Pharmacy of Order to Hold Apixaban, Rivaroxaban, Edoxaban, Dabigatran, Vorapaxar, Atopaxar 48 Hours Before Scheduled Cardiac Procedure    Please Call and Notify Pharmacy of Order to Hold Metformin (including any combination product containing Metformin) 48 Hours Before Scheduled Cardiac Procedure    Cardiology (on-call MD unless specified)    POC Activated Clotting Time    POC Activated Clotting Time    POC Glucose Once    ECG 12 Lead ED Triage Standing Order; SOA    Inpatient Admission    Transfer Patient    Discharge patient    CBC & Differential    Green Top (Gel)    Lavender Top    Gold Top - SST    Light Blue Top       Medications Given in the Emergency Department:  Medications   sodium chloride 0.9 % infusion (75 mL/hr Intravenous New Bag 6/2/23 1418)   famotidine (PEPCID) injection 20 mg (20 mg Intravenous Given 6/2/23 2220)        ED Course:    ED Course as of 06/03/23 1950   Sat Jun 03, 2023   1943 EKG performed at 946  was interpreted by me shows sinus tachycardia with a ventricular rate of 118 bpm.  The parable is 154 ms.  P waves are normal.  QRS interval is normal.  Axis is at 29 degrees.  Patient does not have some ST segment elevation changes in V1 and V2 as well as ST depression changes of the 5 and V6 along with 1 and aVL.  These changes were compared with the prior EKG dated 12/26/2018.  These changes suggest acute ischemia when compared to old EKG. [TB]      ED Course User Index  [TB] Carlo Jimenez DO     The patient was seen and evaluated in the ED by me.  The above history and physical examination was performed as documented.  Diagnostic data was obtained.  Results were reviewed.  Findings were discussed with the patient and his wife.  Patient does not show evidence of an acute ST elevation MI however there is EKG changes along with an abnormal troponin concerning for the likelihood of ACS.  I did discuss this with the patient.  The patient was very dismissive of my concerns.  Patient was however agreeable to admission.  I did discuss case with Dr. Brannon who recommended starting the patient on a heparin drip.  He has the patient admitted to the hospital service.  He was scheduled for heart catheterization later this afternoon.  Patient is aware the treatment plan.  Patient's agreeable to admission.    Labs:    Lab Results (last 24 hours)       Procedure Component Value Units Date/Time    POC Glucose Once [[id number]]  (Abnormal) Collected: 06/02/23 2100    Specimen: Blood Updated: 06/02/23 2101     Glucose 102 mg/dL      Comment: Serial Number: [id number]:  [id number]       aPTT [[id number]]  (Normal) Collected: 06/02/23 2135    Specimen: Blood from Arm, Left Updated: 06/02/23 2216     PTT 26.1 seconds              Imaging:    No Radiology Exams Resulted Within Past 24 Hours        Differential Diagnosis and Discussion:    Chest Pain:  Based on the patient's signs and symptoms, I considered aortic dissection,  myocardial infaction, pulmonary embolism, cardiac tamponade, pericarditis, pneumothorax, musculoskeletal chest pain and other differential diagnosis as an etiology of the patient's chest pain.   Dyspnea: Differential diagnosis includes but is not limited to metabolic acidosis, neurological disorders, psychogenic, asthma, pneumothorax, upper airway obstruction, COPD, pneumonia, noncardiogenic pulmonary edema, interstitial lung disease, anemia, congestive heart failure, and pulmonary embolism    All labs were reviewed and interpreted by me.  All X-rays impressions were independently interpreted by me.  EKG was interpreted by me.    [facility]       Critical Care Note: Total Critical Care time of 40 minutes. Total critical care time documented does not include time spent on separately billed procedures for services of nurses or physician assistants. I personally saw and examined the patient. I have reviewed all diagnostic interpretations and treatment plans as written. I was present for the key portions of any procedures performed and the inclusive time noted in any critical care statement. Critical care time includes patient management by me, time spent at the patients bedside,  time to review lab and imaging results, discussing patient care, documentation in the medical record, and time spent with family or caregiver.    Patient Care Considerations:    ANTIBIOTICS: I considered prescribing antibiotics as an outpatient however there is no evidence of acute bacterial infection      Consultants/Shared Management Plan:    The hospital service was consulted for primary admission.  Dr. Alex agreed to admit the patient.  I also consulted Dr. Reynoso for cardiology.  He requested the patient be started on heparin and plans are to take the patient to Cath Lab sometime this afternoon.    Social Determinants of Health:    Patient is independent, reliable, and has access to care.       Disposition and Care Coordination:    Admit:    Through independent evaluation of the patient's history, physical, and imperical data, the patient meets criteria for observation/admission to the hospital.        Final diagnoses:   ACS (acute coronary syndrome)        ED Disposition       ED Disposition   Decision to Admit    Condition   --    Comment   --             Documentation assistance provided by Mark Tan acting as scribe for Carlo Jimenez DO. Information recorded by the scribe was done at my direction and has been verified and validated by me.       This medical record created using voice recognition software.             Mark Tan  06/02/23 1119       Carlo Jimenez DO  06/03/23 1950

## 2023-06-02 NOTE — PAYOR COMM NOTE
"PATIENT INFORMATION  Name:  Woodrow Garcia  MRN#:     [id number]  :  1950     PRIMARY INSURANCE MEMBER ID:     [id number]        ADMISSION INFORMATION  CLASS: Inpatient   DOS:  2023        CURRENT ATTENDING PROVIDER INFORMATION  Name/NPI Ned Monzon MD [[id number]]  Phone  Phone: [phone]        RENDERING FACILITY  Name:  [facility]   NPI:  [id number]  TID:  [id number]  Address:      [address] [place] [address] [place] [id number]  Phone  [phone]        CASE MANAGEMENT CONTACT INFORMATION  Phone:      [phone]  Fax:           [phone]            HOSPITAL PROBLEM LIST and ADMISSION DIAGNOSES    Problems Addressed this Visit    None  Visit Diagnoses     ACS (acute coronary syndrome)    -  Primary    Relevant Medications    amLODIPine (NORVASC) 5 MG tablet    metoprolol succinate XL (TOPROL-XL) 24 hr tablet 25 mg    nitroglycerin (NITROSTAT) SL tablet 0.4 mg    Other Relevant Orders    Case Request Cath Lab: Left Heart Cath with possible coronary angioplasty (Completed)      Diagnoses       Codes Comments    ACS (acute coronary syndrome)    -  Primary ICD-10-CM: I24.9  ICD-9-CM: 411.1                  Woodrow Garcia (73 y.o. Male)     Date of Birth   1950    Social Security Number       Address   [address] [place] [address]    Home Phone   [phone]    MRN   [id number]       [place]   [employer]    Marital Status                               Admission Date   23    Admission Type   Emergency    Admitting Provider   Ned Monzon MD    Attending Provider   Ned Monzon MD    Department, Room/Bed   [facility] CATH LAB, Pool/CATH       Discharge Date       Discharge Disposition   Transfer to Another Facility    Discharge Destination                               Attending Provider: Ned Monzon MD    Allergies: No Known Allergies    Isolation: None   Infection: None   Code Status: CPR    Ht: 182.9 cm (72\")   Wt: 93.5 kg (206 lb 2.1 " oz)    Admission Cmt: None   Principal Problem: Chest pain [R07.9]                 Active Insurance as of 2023     Primary Coverage     Payor Plan Insurance Group Employer/Plan Group    ANTHEM MEDICARE REPLACEMENT ANTHEM MEDICARE ADVANTAGE [id number]     Payor Plan Address Payor Plan Phone Number Payor Plan Fax Number Effective Dates    [address] [phone]  2022 - None Entered    [facility] [id number]       Subscriber Name Subscriber Birth Date Member ID       WOODROW LEE 1950 [id number]                 Emergency Contacts      (Rel.) Home Phone Work Phone Mobile Phone    CLAUDIO LEE (Spouse) [phone] -- [phone]               History & Physical      Pito Brannon MD at 23 1414          H&P reviewed. The patient was examined and there are no changes to the H&P.          Electronically signed by Pito Brannon MD at 23 1414   Source Note            [facility]   Cardiology Consult Note    Patient Name: Woodrow Lee  : 1950  MRN: [id number]  Primary Care Physician:  Terence Whitaker MD  Referring Physician: No ref. provider found  Date of admission: 2023    Subjective    Subjective     Reason for Consult/ Chief Complaint: Shortness of breath    HPI:  Woodrow Lee is a 73 y.o. male with past medical history significant for hypertension and diabetes, was sent to the emergency room by his primary care provider with abnormal ECG.  The patient has been having progressive shortness of breath which started about 3 weeks ago.  Lately, he is unable to walk more than 100 feet without having to stop due to shortness of breath and general fatigue.  He denies chest discomfort per se.  He has no palpitations, dizziness, presyncope or syncope.  He has no fever, chills or cough.  He is normally able to hike and walk a far distance without stopping.  He has no previous cardiac history.  He was noted to have abnormal ECG concerning for myocardial  ischemia.  He has a rising troponin consistent with non-STEMI.  We were called for further evaluation and treatment.    Review of Systems   All systems were reviewed and negative except as above.      Personal History     Past Medical History:   Diagnosis Date   • Diabetes    • Enlarged prostate    • Hypertension         Family History: family history is not on file. Otherwise pertinent FHx was reviewed and not pertinent to current issue.    Social History:  reports that he has never smoked. He does not have any smokeless tobacco history on file. He reports that he does not drink alcohol and does not use drugs.    Home Medications:  amLODIPine, dapagliflozin Propanediol, glipizide, losartan-hydrochlorothiazide, metFORMIN, and tamsulosin    Allergies:  No Known Allergies    Objective     Objective     Vitals:   Temp:  [98.2 °F (36.8 °C)] 98.2 °F (36.8 °C)  Heart Rate:  [104-124] 104  Resp:  [28] 28  BP: (119-140)/(72-95) 119/72  Flow (L/min):  [2] 2      Physical Exam:   Constitutional: Awake, alert, No acute distress    Eyes: PERRLA, sclerae anicteric, no conjunctival injection   HENT: NCAT, mucous membranes moist   Neck: Supple, no thyromegaly, no lymphadenopathy, trachea midline   Respiratory: Clear to auscultation bilaterally, nonlabored respirations    Cardiovascular: RRR, no murmurs, rubs, or gallops, palpable pedal pulses bilaterally   Gastrointestinal: Positive bowel sounds, soft, nontender, nondistended   Musculoskeletal: No bilateral ankle edema, no clubbing or cyanosis to extremities   Psychiatric: Appropriate affect, cooperative   Neurologic: Oriented x 3, strength symmetric in all extremities, Cranial Nerves grossly intact to confrontation, speech clear   Skin: No rashes     Result Review    Result Review:  I have personally reviewed the results from the time of this admission to 6/2/2023 13:56 EDT and agree with these findings:  [x]  Laboratory  []  Microbiology  [x]  Radiology  [x]  EKG/Telemetry   [x]   Cardiology/Vascular   []  Pathology  [x]  Old records  []  Other:  Most notable findings include:     CMP        2023    09:44   CMP   Glucose 257     BUN 26     Creatinine 0.85     EGFR 92.9     Sodium 135     Potassium 4.5     Chloride 98     Calcium 9.5     Total Protein 7.4     Albumin 4.1     Globulin 3.3     Total Bilirubin 0.9     Alkaline Phosphatase 86     AST (SGOT) 19     ALT (SGPT) 15     Albumin/Globulin Ratio 1.2     BUN/Creatinine Ratio 30.6     Anion Gap 15.1        CBC        2023    09:44   CBC   WBC 8.78     RBC 5.57     Hemoglobin 12.6     Hematocrit 41.0     MCV 73.6     MCH 22.6     MCHC 30.7     RDW 18.4     Platelets 350        Lab Results   Component Value Date    TROPONINT 142 (C) 2023         Assessment & Plan   Assessment / Plan     Brief Patient Summary:  Woodrow Garcia is a 73 y.o. male with:    -Progressive exertional dyspnea and exercise intolerance with abnormal ECG and elevated troponin consistent with non-STEMI  -Hypertension  -Diabetes    Plan:   We will proceed with cardiac catheterization and possible intervention.  Risks versus benefits of the procedure were discussed with the patient and he was agreeable to proceed  Resume home medications for hypertension and diabetes  Further recommendations to follow    Thank you for the consultation    Electronically signed by Pito Brannon MD, 23, 1:56 PM EDT.    Electronically signed by Pito Brannon MD at 23 1994             Pito Brannon MD at 23 1356            [facility]   Cardiology Consult Note    Patient Name: Woodrow Garcia  : 1950  MRN: [id number]  Primary Care Physician:  Terence Whitaker MD  Referring Physician: No ref. provider found  Date of admission: 2023    Subjective    Subjective     Reason for Consult/ Chief Complaint: Shortness of breath    HPI:  Woodrow Garcia is a 73 y.o. male with past medical history significant for hypertension and diabetes, was sent  to the emergency room by his primary care provider with abnormal ECG.  The patient has been having progressive shortness of breath which started about 3 weeks ago.  Lately, he is unable to walk more than 100 feet without having to stop due to shortness of breath and general fatigue.  He denies chest discomfort per se.  He has no palpitations, dizziness, presyncope or syncope.  He has no fever, chills or cough.  He is normally able to hike and walk a far distance without stopping.  He has no previous cardiac history.  He was noted to have abnormal ECG concerning for myocardial ischemia.  He has a rising troponin consistent with non-STEMI.  We were called for further evaluation and treatment.    Review of Systems   All systems were reviewed and negative except as above.      Personal History     Past Medical History:   Diagnosis Date   • Diabetes    • Enlarged prostate    • Hypertension         Family History: family history is not on file. Otherwise pertinent FHx was reviewed and not pertinent to current issue.    Social History:  reports that he has never smoked. He does not have any smokeless tobacco history on file. He reports that he does not drink alcohol and does not use drugs.    Home Medications:  amLODIPine, dapagliflozin Propanediol, glipizide, losartan-hydrochlorothiazide, metFORMIN, and tamsulosin    Allergies:  No Known Allergies    Objective     Objective     Vitals:   Temp:  [98.2 °F (36.8 °C)] 98.2 °F (36.8 °C)  Heart Rate:  [104-124] 104  Resp:  [28] 28  BP: (119-140)/(72-95) 119/72  Flow (L/min):  [2] 2      Physical Exam:   Constitutional: Awake, alert, No acute distress    Eyes: PERRLA, sclerae anicteric, no conjunctival injection   HENT: NCAT, mucous membranes moist   Neck: Supple, no thyromegaly, no lymphadenopathy, trachea midline   Respiratory: Clear to auscultation bilaterally, nonlabored respirations    Cardiovascular: RRR, no murmurs, rubs, or gallops, palpable pedal pulses  bilaterally   Gastrointestinal: Positive bowel sounds, soft, nontender, nondistended   Musculoskeletal: No bilateral ankle edema, no clubbing or cyanosis to extremities   Psychiatric: Appropriate affect, cooperative   Neurologic: Oriented x 3, strength symmetric in all extremities, Cranial Nerves grossly intact to confrontation, speech clear   Skin: No rashes     Result Review    Result Review:  I have personally reviewed the results from the time of this admission to 6/2/2023 13:56 EDT and agree with these findings:  [x]  Laboratory  []  Microbiology  [x]  Radiology  [x]  EKG/Telemetry   [x]  Cardiology/Vascular   []  Pathology  [x]  Old records  []  Other:  Most notable findings include:     CMP        6/2/2023    09:44   CMP   Glucose 257     BUN 26     Creatinine 0.85     EGFR 92.9     Sodium 135     Potassium 4.5     Chloride 98     Calcium 9.5     Total Protein 7.4     Albumin 4.1     Globulin 3.3     Total Bilirubin 0.9     Alkaline Phosphatase 86     AST (SGOT) 19     ALT (SGPT) 15     Albumin/Globulin Ratio 1.2     BUN/Creatinine Ratio 30.6     Anion Gap 15.1        CBC        6/2/2023    09:44   CBC   WBC 8.78     RBC 5.57     Hemoglobin 12.6     Hematocrit 41.0     MCV 73.6     MCH 22.6     MCHC 30.7     RDW 18.4     Platelets 350        Lab Results   Component Value Date    TROPONINT 142 (C) 06/02/2023         Assessment & Plan   Assessment / Plan     Brief Patient Summary:  Woodrow Garcia is a 73 y.o. male with:    -Progressive exertional dyspnea and exercise intolerance with abnormal ECG and elevated troponin consistent with non-STEMI  -Hypertension  -Diabetes    Plan:   We will proceed with cardiac catheterization and possible intervention.  Risks versus benefits of the procedure were discussed with the patient and he was agreeable to proceed  Resume home medications for hypertension and diabetes  Further recommendations to follow    Thank you for the consultation    Electronically signed by Pito  MD Salud, 06/02/23, 1:56 PM EDT.    Electronically signed by Pito Brannon MD at 06/02/23 1359       Emergency Department Notes    No notes of this type exist for this encounter.         Vital Signs (last day)     Date/Time Temp Temp src Pulse Resp BP Patient Position SpO2    06/02/23 14:16:59 -- -- -- -- -- -- 89    06/02/23 1345 -- -- 104 -- -- -- 93    06/02/23 1100 -- -- 105 -- 119/72 -- 92    06/02/23 0940 98.2 (36.8) -- -- 28 140/95 -- --    06/02/23 0939 -- Oral 124 -- -- Sitting 90          Oxygen Therapy (last day)     Date/Time SpO2 Device (Oxygen Therapy) Flow (L/min) Oxygen Concentration (%) ETCO2 (mmHg)    06/02/23 14:16:59 89 nasal cannula 2 -- --    06/02/23 1345 93 nasal cannula 2 -- --    06/02/23 1100 92 -- -- -- --    06/02/23 0939 90 room air -- -- --            Facility-Administered Medications as of 6/2/2023   Medication Dose Route Frequency Provider Last Rate Last Admin   • acetaminophen (TYLENOL) tablet 650 mg  650 mg Oral Q4H PRN Ned Monzon MD       • aspirin EC tablet 81 mg  81 mg Oral Daily Ned Monzon MD       • sennosides-docusate (PERICOLACE) 8.6-50 MG per tablet 2 tablet  2 tablet Oral BID Ned Monzon MD        And   • polyethylene glycol (MIRALAX) packet 17 g  17 g Oral Daily PRN Ned Monzon MD        And   • bisacodyl (DULCOLAX) EC tablet 5 mg  5 mg Oral Daily PRN Ned Monzon MD        And   • bisacodyl (DULCOLAX) suppository 10 mg  10 mg Rectal Daily PRN Ned Monzon MD       • calcium carbonate (TUMS) chewable tablet 500 mg (200 mg elemental)  1 tablet Oral BID PRN Ned Monzon MD       • metoprolol succinate XL (TOPROL-XL) 24 hr tablet 25 mg  25 mg Oral Q24H Ned Monzon MD       • nitroglycerin (NITROSTAT) SL tablet 0.4 mg  0.4 mg Sublingual Q5 Min PRN Ned Monzon MD       • ondansetron (ZOFRAN) tablet 4 mg  4 mg Oral Q6H PRN Ned Monzon MD       • Pharmacy to Dose enoxaparin (LOVENOX)   Does not apply Continuous PRN Ned Monzon MD       •  rosuvastatin (CRESTOR) tablet 40 mg  40 mg Oral Nightly Ned Monzon MD       • sodium chloride 0.9 % flush 10 mL  10 mL Intravenous PRN Ned Monzon MD       • sodium chloride 0.9 % flush 10 mL  10 mL Intravenous Q12H Ned Monzon MD       • sodium chloride 0.9 % flush 10 mL  10 mL Intravenous PRN Ned Monzon MD       • sodium chloride 0.9 % infusion 40 mL  40 mL Intravenous PRN Ned Monzon MD       • [COMPLETED] sodium chloride 0.9 % infusion    Code / Trauma / Sedation Continuous Med Pito Brannon MD 75 mL/hr at 06/02/23 1418 75 mL/hr at 06/02/23 1418   • tamsulosin (FLOMAX) 24 hr capsule 0.4 mg  0.4 mg Oral Daily Ned Monzon MD           Lab Results (last 24 hours)     Procedure Component Value Units Date/Time    POC Activated Clotting Time [[id number]]  (Abnormal) Collected: 06/02/23 1534    Specimen: Blood Updated: 06/02/23 1540     Activated Clotting Time  191 Seconds      Comment: Serial Number: [id number]:  [id number]       POC Activated Clotting Time [[id number]]  (Abnormal) Collected: 06/02/23 1507    Specimen: Blood Updated: 06/02/23 1513     Activated Clotting Time  173 Seconds      Comment: Serial Number: [id number]:  [id number]       Protime-INR [[id number]]  (Normal) Collected: 06/02/23 1341    Specimen: Blood Updated: 06/02/23 1407     Protime 13.4 Seconds      INR 1.01    Narrative:      Suggested Therapeutic Ranges For Oral Anticoagulant Therapy:  Level of Therapy                      INR Target Range  Standard Dose                            2.0-3.0  High Dose                                2.5-3.5  Patients not receiving anticoagulant  Therapy Normal Range                     0.86-1.15    aPTT [[id number]]  (Abnormal) Collected: 06/02/23 1341    Specimen: Blood Updated: 06/02/23 1407     PTT 25.3 seconds     High Sensitivity Troponin T 2Hr [[id number]]  (Abnormal) Collected: 06/02/23 1227    Specimen: Blood Updated: 06/02/23 1325     HS Troponin T 142 ng/L      Troponin T Delta 19 ng/L      Narrative:      High Sensitive Troponin T Reference Range:  <10.0 ng/L- Negative Female for AMI  <15.0 ng/L- Negative Male for AMI  >=10 - Abnormal Female indicating possible myocardial injury.  >=15 - Abnormal Male indicating possible myocardial injury.   Clinicians would have to utilize clinical acumen, EKG, Troponin, and serial changes to determine if it is an Acute Myocardial Infarction or myocardial injury due to an underlying chronic condition.         Comprehensive Metabolic Panel [[id number]]  (Abnormal) Collected: 06/02/23 0944    Specimen: Blood Updated: 06/02/23 1100     Glucose 257 mg/dL      BUN 26 mg/dL      Creatinine 0.85 mg/dL      Sodium 135 mmol/L      Potassium 4.5 mmol/L      Chloride 98 mmol/L      CO2 21.9 mmol/L      Calcium 9.5 mg/dL      Total Protein 7.4 g/dL      Albumin 4.1 g/dL      ALT (SGPT) 15 U/L      AST (SGOT) 19 U/L      Alkaline Phosphatase 86 U/L      Total Bilirubin 0.9 mg/dL      Globulin 3.3 gm/dL      A/G Ratio 1.2 g/dL      BUN/Creatinine Ratio 30.6     Anion Gap 15.1 mmol/L      eGFR 92.9 mL/min/1.73     Narrative:      GFR Normal >60  Chronic Kidney Disease <60  Kidney Failure <15    The GFR formula is only valid for adults with stable renal function between ages 18 and 70.    Single High Sensitivity Troponin T [[id number]]  (Abnormal) Collected: 06/02/23 0944    Specimen: Blood Updated: 06/02/23 1053     HS Troponin T 123 ng/L     Narrative:      High Sensitive Troponin T Reference Range:  <10.0 ng/L- Negative Female for AMI  <15.0 ng/L- Negative Male for AMI  >=10 - Abnormal Female indicating possible myocardial injury.  >=15 - Abnormal Male indicating possible myocardial injury.   Clinicians would have to utilize clinical acumen, EKG, Troponin, and serial changes to determine if it is an Acute Myocardial Infarction or myocardial injury due to an underlying chronic condition.         CBC & Differential [[id number]]  (Abnormal) Collected: 06/02/23 0944    Specimen:  Blood Updated: 06/02/23 1041    Narrative:      The following orders were created for panel order CBC & Differential.  Procedure                               Abnormality         Status                     ---------                               -----------         ------                     CBC Auto Differential[[id number]]        Abnormal            Final result               Scan Slide[[id number]]                                       Final result                 Please view results for these tests on the individual orders.    CBC Auto Differential [[id number]]  (Abnormal) Collected: 06/02/23 0944    Specimen: Blood Updated: 06/02/23 1041     WBC 8.78 10*3/mm3      RBC 5.57 10*6/mm3      Hemoglobin 12.6 g/dL      Hematocrit 41.0 %      MCV 73.6 fL      MCH 22.6 pg      MCHC 30.7 g/dL      RDW 18.4 %      RDW-SD 45.4 fl      MPV 9.4 fL      Platelets 350 10*3/mm3      Neutrophil % 82.2 %      Lymphocyte % 8.1 %      Monocyte % 7.3 %      Eosinophil % 0.7 %      Basophil % 0.8 %      Immature Grans % 0.9 %      Neutrophils, Absolute 7.22 10*3/mm3      Lymphocytes, Absolute 0.71 10*3/mm3      Monocytes, Absolute 0.64 10*3/mm3      Eosinophils, Absolute 0.06 10*3/mm3      Basophils, Absolute 0.07 10*3/mm3      Immature Grans, Absolute 0.08 10*3/mm3      nRBC 0.0 /100 WBC     Scan Slide [[id number]] Collected: 06/02/23 0944    Specimen: Blood Updated: 06/02/23 1041     Anisocytosis Slight/1+     Microcytes Slight/1+     WBC Morphology Normal     Platelet Estimate Adequate    BNP [[id number]]  (Abnormal) Collected: 06/02/23 0944    Specimen: Blood Updated: 06/02/23 1035     proBNP 1,339.0 pg/mL     Narrative:      Among patients with dyspnea, NT-proBNP is highly sensitive for the detection of acute congestive heart failure. In addition NT-proBNP of <300 pg/ml effectively rules out acute congestive heart failure with 99% negative predictive value.      [place] Draw [[id number]] Collected: 06/02/23 0944    Specimen: Blood  Updated: 06/02/23 1000    Narrative:      The following orders were created for panel order [place] Draw.  Procedure                               Abnormality         Status                     ---------                               -----------         ------                     Green Top (Gel)[[id number]]                                  Final result               Lavender Top[[id number]]                                     Final result               Gold Top - SST[[id number]]                                   Final result               Light Blue Top[[id number]]                                   Final result                 Please view results for these tests on the individual orders.    Gold Top - SST [[id number]] Collected: 06/02/23 0944    Specimen: Blood Updated: 06/02/23 1000     Extra Tube Hold for add-ons.     Comment: Auto resulted.       Green Top (Gel) [[id number]] Collected: 06/02/23 0944    Specimen: Blood Updated: 06/02/23 1000     Extra Tube Hold for add-ons.     Comment: Auto resulted.       Light Blue Top [[id number]] Collected: 06/02/23 0944    Specimen: Blood Updated: 06/02/23 1000     Extra Tube Hold for add-ons.     Comment: Auto resulted       Lavender Top [[id number]] Collected: 06/02/23 0944    Specimen: Blood Updated: 06/02/23 1000     Extra Tube hold for add-on     Comment: Auto resulted           Imaging Results (Last 24 Hours)     Procedure Component Value Units Date/Time    XR Chest 1 View [[id number]] Collected: 06/02/23 1004     Updated: 06/02/23 1007    Narrative:      PROCEDURE: XR CHEST 1 VW     COMPARISON: None     INDICATIONS: short of breath     FINDINGS:   The heart size is within normal limits.  There are alveolar interstitial opacities throughout both   lungs greatest in the lung bases which could be from pulmonary edema or pneumonia.  There is a   small left pleural effusion.       Impression:       Multifocal infiltrates favors pneumonia.                  VERONIKA SIDDIQUI MD          Electronically Signed and Approved By: VERONIKA SIDDIQUI MD on 6/02/2023 at 10:04                         ECG/EMG Results (last 24 hours)     Procedure Component Value Units Date/Time    ECG 12 Lead ED Triage Standing Order; BETTE [[id number]] Collected: 06/02/23 0946     Updated: 06/02/23 0947     QT Interval 306 ms     Narrative:      HEART RATE= 118  bpm  RR Interval= 508  ms  MO Interval= 154  ms  P Horizontal Axis= 10  deg  P Front Axis= 56  deg  QRSD Interval= 86  ms  QT Interval= 306  ms  QRS Axis= 29  deg  T Wave Axis= 168  deg  - ABNORMAL ECG -  Sinus tachycardia  Probable anteroseptal infarct, recent  Abnormal T, consider ischemia, lateral leads  No previous ECG available for comparison  Electronically Signed By:   Date and Time of Study: 2023-06-02 09:46:02          Orders (active)      Start     Ordered    06/03/23 0600  Basic Metabolic Panel  Morning Draw         06/02/23 1418    06/03/23 0600  CBC Auto Differential  Morning Draw         06/02/23 1418    06/03/23 0600  Magnesium  Morning Draw         06/02/23 1418    06/03/23 0000  Tobacco Cessation Education  Prior to Discharge         06/02/23 1418    06/02/23 2100  rosuvastatin (CRESTOR) tablet 40 mg  Nightly         06/02/23 1401    06/02/23 2100  sodium chloride 0.9 % flush 10 mL  Every 12 Hours Scheduled         06/02/23 1418    06/02/23 2100  sennosides-docusate (PERICOLACE) 8.6-50 MG per tablet 2 tablet  2 Times Daily        See Hyperspace for full Linked Orders Report.    06/02/23 1418    06/02/23 1800  Oral Care  2 Times Daily       06/02/23 1418    06/02/23 1634  POC Activated Clotting Time  PROCEDURE ONCE         06/02/23 1540    06/02/23 1607  POC Activated Clotting Time  PROCEDURE ONCE         06/02/23 1513    06/02/23 1600  Vital Signs  Every 4 Hours       06/02/23 1418    06/02/23 1600  Strict intake and output  Every 4 Hours       06/02/23 1549    06/02/23 1553  Discharge patient  Once         06/02/23 1553    06/02/23 1549  Hold metFORMIN  (GLUCOPHAGE) for 48 Hours  Continuous         06/02/23 1549    06/02/23 1549  Discontinue Radial TR Band Per Removal Protocol  Per Order Details        Comments: If Bleeding Occurs Re-Inflate 2 mL & Then Continue With 2 mL Every 15 Minute Deflations. Gently Roll Band Off Insertion Site After Completely Deflated.    06/02/23 1549    06/02/23 1549  Vital Signs & Reverse Delano's Test (While Radial Compression Device in Place)  Per Order Details        Comments: Every 15 Minutes x4, Every 30 Minutes x4, & Every 1 Hour x2    06/02/23 1549    06/02/23 1549  Keep Affected Arm Straight & Elevated  Continuous         06/02/23 1549    06/02/23 1549  Activity As Tolerated  Until Discontinued         06/02/23 1549    06/02/23 1548  Vital Signs and Check distal extremity for warmth, color, sensation and pulses with each vital sign and site check.  Per Order Details        Comments: Check distal extremity for warmth, color, sensation and pulses with each vital sign and site check.    06/02/23 1549    06/02/23 1548  Encourage fluids  Until Discontinued         06/02/23 1549    06/02/23 1548  Notify MD if platelet count is less than 100,000, is less than 1/2 baseline, or if Hgb drops by more than 3mg/dl.  Until Discontinued         06/02/23 1549    06/02/23 1548  Notify MD of hypotension (SBP less than 95), bleeding, or dysrythmia and follow Sheath Removal Policy if needed.  Continuous         06/02/23 1549    06/02/23 1548  Cardiac Rehab Evaluation and Enrollment  Once        Provider:  (Not yet assigned)    06/02/23 1549    06/02/23 1544  Transfer Patient  Once         06/02/23 1543    06/02/23 1515  tamsulosin (FLOMAX) 24 hr capsule 0.4 mg  Daily         06/02/23 1418    06/02/23 1500  aspirin EC tablet 81 mg  Daily         06/02/23 1401    06/02/23 1500  metoprolol succinate XL (TOPROL-XL) 24 hr tablet 25 mg  Every 24 Hours Scheduled         06/02/23 1401    06/02/23 1418  Code Status and Medical Interventions:  Continuous          06/02/23 1418    06/02/23 1418  Continuous Cardiac Monitoring  Continuous        Comments: Follow Standing Orders As Outlined in Process Instructions (Open Order Report to View Full Instructions)    06/02/23 1418    06/02/23 1418  Telemetry - Maintain IV Access  Continuous         06/02/23 1418    06/02/23 1417  nitroglycerin (NITROSTAT) SL tablet 0.4 mg  Every 5 Minutes PRN         06/02/23 1418    06/02/23 1417  Pharmacy to Dose enoxaparin (LOVENOX)  Continuous PRN         06/02/23 1418    06/02/23 1417  Notify Provider (With Default Parameters)  Until Discontinued         06/02/23 1418    06/02/23 1417  Intake & Output  Every Shift       06/02/23 1418    06/02/23 1417  Weigh Patient  Once         06/02/23 1418    06/02/23 1417  ECG 12 Lead  Once         06/02/23 1418    06/02/23 1417  Insert Peripheral IV  Once         06/02/23 1418    06/02/23 1416  polyethylene glycol (MIRALAX) packet 17 g  Daily PRN        See Hyperspace for full Linked Orders Report.    06/02/23 1418    06/02/23 1416  bisacodyl (DULCOLAX) EC tablet 5 mg  Daily PRN        See Hyperspace for full Linked Orders Report.    06/02/23 1418    06/02/23 1416  bisacodyl (DULCOLAX) suppository 10 mg  Daily PRN        See Hyperspace for full Linked Orders Report.    06/02/23 1418    06/02/23 1416  ondansetron (ZOFRAN) tablet 4 mg  Every 6 Hours PRN         06/02/23 1418    06/02/23 1416  sodium chloride 0.9 % flush 10 mL  As Needed         06/02/23 1418    06/02/23 1416  sodium chloride 0.9 % infusion 40 mL  As Needed         06/02/23 1418    06/02/23 1416  acetaminophen (TYLENOL) tablet 650 mg  Every 4 Hours PRN         06/02/23 1418    06/02/23 1416  calcium carbonate (TUMS) chewable tablet 500 mg (200 mg elemental)  2 Times Daily PRN         06/02/23 1418    06/02/23 1401  Hold Apixaban, Rivaroxaban, Edoxaban, Dabigatran, Vorapaxar, Atopaxar 48 Hours Before Scheduled Cardiac Procedure  Per Order Details        Comments: Hold Apixaban, Rivaroxaban,  Edoxaban, Dabigatran, Vorapaxar, Atopaxar 48 Hours Before Scheduled Cardiac Procedure    06/02/23 1400    06/02/23 1401  Please Call and Notify Pharmacy of Order to Hold Apixaban, Rivaroxaban, Edoxaban, Dabigatran, Vorapaxar, Atopaxar 48 Hours Before Scheduled Cardiac Procedure  Once        Comments: Please Call and Notify Pharmacy of Order to Hold Apixaban, Rivaroxaban, Edoxaban, Dabigatran, Vorapaxar, Atopaxar 48 Hours Before Scheduled Cardiac Procedure    06/02/23 1400    06/02/23 1401  Hold Metformin (including any combination product containing Metformin) 48 Hours Before Scheduled Cardiac Procedure  Per Order Details        Comments: Hold Metformin (including any combination product containing Metformin) 48 Hours Before Scheduled Cardiac Procedure    06/02/23 1400    06/02/23 1401  Please Call and Notify Pharmacy of Order to Hold Metformin (including any combination product containing Metformin) 48 Hours Before Scheduled Cardiac Procedure  Once        Comments: Please Call and Notify Pharmacy of Order to Hold Metformin (including any combination product containing Metformin) 48 Hours Before Scheduled Cardiac Procedure    06/02/23 1400    06/02/23 0944  NPO Diet NPO Type: Strict NPO  Diet Effective Now         06/02/23 0943    06/02/23 0944  Insert Peripheral IV  Once         06/02/23 0943    06/02/23 0943  sodium chloride 0.9 % flush 10 mL  As Needed         06/02/23 0943    Unscheduled  Oxygen Therapy- Nasal Cannula; Titrate 1-6 LPM Per SpO2; 90 - 95%  Continuous PRN       06/02/23 0943    Unscheduled  Change site dressing  As Needed       06/02/23 1540    Signed and Held  Obtain Informed Consent  Once         Signed and Held    Signed and Held  Clip Bilateral Groins  Once         Signed and Held    Signed and Held  Clip Bilateral Arms  Once         Signed and Held    Signed and Held  Record Actual Height & Weight Prior to Procedure  Once         Signed and Held    Signed and Held  [place] Test Prior to  Procedure  Once         Signed and Held    Signed and Held  Obtain Informed Consent in Pre-Op  Once         Signed and Held    Signed and Held  Verify On Day of Procedure: No Apixaban, Rivaroxaban, Edoxaban, Dabigatran, Vorapaxar, Atopaxar Administration 48 Hours Before Cardiac Procedure  Once        Comments: Verify On Day of Procedure: No Apixaban, Rivaroxaban, Edoxaban, Dabigatran, Vorapaxar, Atopaxar Administration 48 Hours Before Cardiac Procedure    Signed and Held    Signed and Held  Verify On Day of Procedure: No Metformin (including any combination product containing Metformin) 48 Hours Before Scheduled Cardiac Procedure  Once        Comments: Verify On Day of Procedure: No Metformin (including any combination product containing Metformin) 48 Hours Before Scheduled Cardiac Procedure    Signed and Held    Signed and Held  Notify MD If Patient is Taking Any of the Listed Medications or If They Have A Contrast Allergy  Once        Comments: Glucophage (metformin)  Fortamet (metform)  Glumetza (metformin)  Riomet (metformin)  Avandamet (metformin/rosiglitazone)  Glucovance (metformin/glyburide)  PrandiMet (metformin/repaglinide)  Metaglip (metformin/glipzide)  Janumet (metformin/sitaliptin)  Actoplus Met (metformin/pioglitazone)  Coumadin (wafarin)  Pradaxa (dabigatran)  Xarelto (rivaroxaban)    Signed and Held    Signed and Held  Notify MD  Continuous         Signed and Held    Signed and Held  Notify Provider  Until Discontinued         Signed and Held    Signed and Held  Continuous Cardiac Monitoring  Continuous        Comments: Follow Standing Orders As Outlined in Process Instructions (Open Order Report to View Full Instructions)    Signed and Held    Signed and Held  Telemetry - Maintain IV Access  Continuous         Signed and Held    Signed and Held  Telemetry - Place Orders & Notify Provider of Results When Patient Experiences Acute Chest Pain, Dysrhythmia or Respiratory Distress  Until Discontinued          Signed and Held    Signed and Held  Oxygen Therapy- Nasal Cannula; 2 LPM  Continuous PRN         Signed and Held    Signed and Held  Continuous Pulse Oximetry  Continuous         Signed and Held    Signed and Held  Incentive Spirometry  Every 2 Hours While Awake       Signed and Held    Signed and Held  Advance Diet As Tolerated -  Until Discontinued         Signed and Held    Signed and Held  ECG 12 Lead  Once         Signed and Held    Signed and Held  CBC (No Diff)  Morning Draw         Signed and Held    Signed and Held  Hemoglobin A1c  Morning Draw         Signed and Held    Signed and Held  Lipid Panel  Morning Draw        Comments: Fasting      Signed and Held    Signed and Held  acetaminophen (TYLENOL) tablet 650 mg  Every 4 Hours PRN         Signed and Held    Signed and Held  Diet: Cardiac Diets, Diabetic Diets; Healthy Heart (2-3 Na+); Consistent Carbohydrate; Texture: Regular Texture (IDDSI 7); Fluid Consistency: Thin (IDDSI 0)  Diet Effective Now         Signed and Held                     Consult Notes (last 24 hours)      Pito Brannon MD at 23 1356      Consult Orders    1. Cardiology (on-call MD unless specified) [[id number]] ordered by Carlo Jimenez DO at 23 1314                 [facility]   Cardiology Consult Note    Patient Name: Woodrow Garcia  : 1950  MRN: [id number]  Primary Care Physician:  Terence Whitaker MD  Referring Physician: No ref. provider found  Date of admission: 2023    Subjective   Subjective     Reason for Consult/ Chief Complaint: Shortness of breath    HPI:  Woodrow Garcia is a 73 y.o. male with past medical history significant for hypertension and diabetes, was sent to the emergency room by his primary care provider with abnormal ECG.  The patient has been having progressive shortness of breath which started about 3 weeks ago.  Lately, he is unable to walk more than 100 feet without having to stop due to shortness of breath and general  fatigue.  He denies chest discomfort per se.  He has no palpitations, dizziness, presyncope or syncope.  He has no fever, chills or cough.  He is normally able to hike and walk a far distance without stopping.  He has no previous cardiac history.  He was noted to have abnormal ECG concerning for myocardial ischemia.  He has a rising troponin consistent with non-STEMI.  We were called for further evaluation and treatment.    Review of Systems   All systems were reviewed and negative except as above.      Personal History     Past Medical History:   Diagnosis Date   • Diabetes    • Enlarged prostate    • Hypertension         Family History: family history is not on file. Otherwise pertinent FHx was reviewed and not pertinent to current issue.    Social History:  reports that he has never smoked. He does not have any smokeless tobacco history on file. He reports that he does not drink alcohol and does not use drugs.    Home Medications:  amLODIPine, dapagliflozin Propanediol, glipizide, losartan-hydrochlorothiazide, metFORMIN, and tamsulosin    Allergies:  No Known Allergies    Objective    Objective     Vitals:   Temp:  [98.2 °F (36.8 °C)] 98.2 °F (36.8 °C)  Heart Rate:  [104-124] 104  Resp:  [28] 28  BP: (119-140)/(72-95) 119/72  Flow (L/min):  [2] 2      Physical Exam:   Constitutional: Awake, alert, No acute distress    Eyes: PERRLA, sclerae anicteric, no conjunctival injection   HENT: NCAT, mucous membranes moist   Neck: Supple, no thyromegaly, no lymphadenopathy, trachea midline   Respiratory: Clear to auscultation bilaterally, nonlabored respirations    Cardiovascular: RRR, no murmurs, rubs, or gallops, palpable pedal pulses bilaterally   Gastrointestinal: Positive bowel sounds, soft, nontender, nondistended   Musculoskeletal: No bilateral ankle edema, no clubbing or cyanosis to extremities   Psychiatric: Appropriate affect, cooperative   Neurologic: Oriented x 3, strength symmetric in all extremities, Cranial  Nerves grossly intact to confrontation, speech clear   Skin: No rashes     Result Review    Result Review:  I have personally reviewed the results from the time of this admission to 6/2/2023 13:56 EDT and agree with these findings:  [x]  Laboratory  []  Microbiology  [x]  Radiology  [x]  EKG/Telemetry   [x]  Cardiology/Vascular   []  Pathology  [x]  Old records  []  Other:  Most notable findings include:     CMP        6/2/2023    09:44   CMP   Glucose 257     BUN 26     Creatinine 0.85     EGFR 92.9     Sodium 135     Potassium 4.5     Chloride 98     Calcium 9.5     Total Protein 7.4     Albumin 4.1     Globulin 3.3     Total Bilirubin 0.9     Alkaline Phosphatase 86     AST (SGOT) 19     ALT (SGPT) 15     Albumin/Globulin Ratio 1.2     BUN/Creatinine Ratio 30.6     Anion Gap 15.1        CBC        6/2/2023    09:44   CBC   WBC 8.78     RBC 5.57     Hemoglobin 12.6     Hematocrit 41.0     MCV 73.6     MCH 22.6     MCHC 30.7     RDW 18.4     Platelets 350        Lab Results   Component Value Date    TROPONINT 142 (C) 06/02/2023         Assessment & Plan   Assessment / Plan     Brief Patient Summary:  Woodrow Garcia is a 73 y.o. male with:    -Progressive exertional dyspnea and exercise intolerance with abnormal ECG and elevated troponin consistent with non-STEMI  -Hypertension  -Diabetes    Plan:   We will proceed with cardiac catheterization and possible intervention.  Risks versus benefits of the procedure were discussed with the patient and he was agreeable to proceed  Resume home medications for hypertension and diabetes  Further recommendations to follow    Thank you for the consultation    Electronically signed by Pito Brannon MD, 06/02/23, 1:56 PM EDT.    Electronically signed by Pito Brannon MD at 06/02/23 1354       Discharge Order (From admission, onward)     Start     Ordered    06/02/23 1553  Discharge patient  Once        Expected Discharge Date: 06/02/23    Discharge Disposition: Transfer  to Another Facility    Physician of Record for Attribution - Please select from Treatment Team: ARTHUR SHARMA [[id number]]    Review needed by CMO to determine Physician of Record: No       Question Answer Comment   Physician of Record for Attribution - Please select from Treatment Team ARTHUR SHARMA    Review needed by CMO to determine Physician of Record No        06/02/23 1559

## 2023-06-02 NOTE — Clinical Note
A 8 fr sheath was  inserted using micropuncture technique with ultrasound guidance into the right femoral artery. Sheath insertion not delayed.

## 2023-06-03 LAB — QT INTERVAL: 306 MS

## 2023-06-07 LAB
B PERT.PT PRMT NPH QL NAA+NON-PROBE: NOT DETECTED
C PNEUM DNA NPH QL NAA+NON-PROBE: NOT DETECTED
FLUAV H1 2009 PAN RNA NPH NAA+NON-PROBE: NOT DETECTED
FLUAV H1 RNA NPH QL NAA+NON-PROBE: NOT DETECTED
FLUAV H3 RNA NPH QL NAA+NON-PROBE: NOT DETECTED
FLUAV RNA NPH QL NAA+NON-PROBE: NOT DETECTED
FLUBV RNA NPH QL NAA+NON-PROBE: NOT DETECTED
HADV DNA NPH QL NAA+NON-PROBE: NOT DETECTED
HCOV 229E RNA NPH QL NAA+NON-PROBE: NOT DETECTED
HCOV HKU1 RNA NPH QL NAA+NON-PROBE: NOT DETECTED
HCOV NL63 RNA NPH QL NAA+NON-PROBE: NOT DETECTED
HCOV OC43 RNA NPH QL NAA+NON-PROBE: NOT DETECTED
HMPV RNA NPH QL NAA+NON-PROBE: NOT DETECTED
HPIV1 RNA NPH QL NAA+NON-PROBE: NOT DETECTED
HPIV2 RNA NPH QL NAA+NON-PROBE: NOT DETECTED
HPIV3 RNA NPH QL NAA+NON-PROBE: NOT DETECTED
HPIV4 RNA NPH QL NAA+NON-PROBE: NOT DETECTED
M PNEUMO DNA NPH QL NAA+NON-PROBE: NOT DETECTED
RSV RNA NPH QL NAA+NON-PROBE: NOT DETECTED
RV+EV RNA NPH QL NAA+NON-PROBE: NOT DETECTED

## 2023-06-18 NOTE — PROGRESS NOTES
Chief Complaint  Hospital Follow Up Visit (Heart cath done on 6/2/2023.)    Subjective        History of Present Illness  Woodrow Garcia presents to [facility] CARDIOLOGY for follow up.  Patient is a 73-year-old  male with past medical history outlined below, significant for diabetes and hypertension.  Patient initially presented to his primary care physician with complaints of shortness of breath and was found to have an abnormal EKG at which time he was transferred to [facility].  On arrival troponins were consistent with NSTEMI.  He was taken to the Cath Lab for further evaluation where he was found to have heavily calcified coronary arteries with severe multivessel CAD involving the distal left main and ostial LAD.  A balloon pump was placed at that time and he was transferred to [facility] in [place] for CABG.  Today he is status post CABG x3 vessel (LIMA to LAD, SVG to ramus plus RCA) on 6/5/2023 by Dr. Peter at [facility].  Follow-up BELLE showed LVEF postoperatively 41%.  His hospital stay was complicated with R on T VT postoperatively where he received DC cardioversion and a short run of chest compressions.  He was started on amiodarone but this was discontinued prior to discharge.  Follow-up echocardiogram during hospitalization demonstrated an ejection fraction of 35% in the decision was made to discharge the patient wearing a LifeVest.    Today the patient reports that he is doing well.  He denies any pain at all.  He is compliant with his LifeVest and medications without adverse effects.  He remains on 2 L of oxygen which she was discharged from the hospital on.  He is dependent on walker for ambulation.  He gets short of breath with mild exertion.  He does note bilateral lower extremity edema.  He denies any chest pain or discomfort, orthopnea, dizziness, lightheadedness, palpitations or syncope.      Past Medical History:   Diagnosis Date    Diabetes      Enlarged prostate     Hypertension        ALLERGY  No Known Allergies     Past Surgical History:   Procedure Laterality Date    CARDIAC CATHETERIZATION N/A 6/2/2023    Procedure: Left Heart Cath;  Surgeon: Pito Brannon MD;  Location: [facility] CATH INVASIVE LOCATION;  Service: Cardiology;  Laterality: N/A;    CARDIAC CATHETERIZATION N/A 6/2/2023    Procedure: Intra-Aortic Baloon Pump Insertion;  Surgeon: Pito Brannon MD;  Location: [facility] CATH INVASIVE LOCATION;  Service: Cardiology;  Laterality: N/A;        Social History     Socioeconomic History    Marital status:    Tobacco Use    Smoking status: Never     Passive exposure: Never    Smokeless tobacco: Never   Vaping Use    Vaping Use: Never used   Substance and Sexual Activity    Alcohol use: Never    Drug use: Never       History reviewed. No pertinent family history.     Current Outpatient Medications on File Prior to Visit   Medication Sig    aspirin 81 MG EC tablet Take 1 tablet by mouth Daily.    atorvastatin (LIPITOR) 80 MG tablet Take 1 tablet by mouth every night at bedtime.    Farxiga 10 MG tablet Take 10 mg by mouth Daily.    glipizide (GLUCOTROL) 10 MG tablet Take 1 tablet by mouth 2 (Two) Times a Day Before Meals.    Lantus SoloStar 100 UNIT/ML injection pen Daily As Needed.    Magnesium Oxide 400 (240 Mg) MG tablet Take 1 tablet by mouth Every 12 (Twelve) Hours.    metFORMIN (GLUCOPHAGE) 1000 MG tablet Take 1 tablet by mouth 2 (Two) Times a Day With Meals.    metoprolol tartrate (LOPRESSOR) 25 MG tablet Take 0.5 tablets by mouth Every 12 (Twelve) Hours.    sennosides-docusate (senna-docusate sodium) 8.6-50 MG per tablet Take 1 tablet by mouth Daily.    tamsulosin (FLOMAX) 0.4 MG capsule 24 hr capsule Take 1 capsule by mouth Daily.    [DISCONTINUED] clopidogrel (PLAVIX) 75 MG tablet Take 1 tablet by mouth Daily.    [DISCONTINUED] amLODIPine (NORVASC) 5 MG tablet Take 1 tablet by mouth Daily. (Patient not taking: Reported on  "6/19/2023)    [DISCONTINUED] losartan-hydrochlorothiazide (HYZAAR) 100-12.5 MG per tablet Take 1 tablet by mouth Daily. (Patient not taking: Reported on 6/19/2023)     No current facility-administered medications on file prior to visit.       Objective   Vitals:    06/19/23 1043   BP: 114/60   Pulse: 76   Weight: 83.5 kg (184 lb)   Height: 182.9 cm (72\")       Physical Exam  Constitutional:       General: He is awake. He is not in acute distress.     Appearance: Normal appearance.   HENT:      Head: Normocephalic.      Nose: Nose normal. No congestion.   Eyes:      Extraocular Movements: Extraocular movements intact.      Conjunctiva/sclera: Conjunctivae normal.      Pupils: Pupils are equal, round, and reactive to light.   Neck:      Thyroid: No thyromegaly.      Vascular: No JVD.   Cardiovascular:      Rate and Rhythm: Normal rate and regular rhythm.      Chest Wall: PMI is not displaced.      Pulses: Normal pulses.      Heart sounds: Normal heart sounds, S1 normal and S2 normal. No murmur heard.    No friction rub. No gallop. No S3 or S4 sounds.   Pulmonary:      Effort: Pulmonary effort is normal.      Breath sounds: Normal breath sounds. No wheezing, rhonchi or rales.   Abdominal:      General: Bowel sounds are normal.      Palpations: Abdomen is soft.      Tenderness: There is no abdominal tenderness.   Musculoskeletal:      Cervical back: No tenderness.      Right lower leg: No edema.      Left lower leg: No edema.   Lymphadenopathy:      Cervical: No cervical adenopathy.   Skin:     General: Skin is warm and dry.      Capillary Refill: Capillary refill takes less than 2 seconds.      Coloration: Skin is not cyanotic.      Findings: No petechiae or rash.      Nails: There is no clubbing.      Comments: Midsternal incision well approximated, healing.  Chest tube sites remain with sutures.  No drainage, or erythema noted.   Neurological:      Mental Status: He is alert.   Psychiatric:         Mood and Affect: " Mood normal.         Behavior: Behavior is cooperative.         Result Review     The following data was reviewed by LINNETTE Blair on 06/19/23.    proBNP   Date Value Ref Range Status   06/02/2023 1,339.0 (H) 0.0 - 900.0 pg/mL Final     CMP          6/2/2023    09:44   CMP   Glucose 257    BUN 26    Creatinine 0.85    EGFR 92.9    Sodium 135    Potassium 4.5    Chloride 98    Calcium 9.5    Total Protein 7.4    Albumin 4.1    Globulin 3.3    Total Bilirubin 0.9    Alkaline Phosphatase 86    AST (SGOT) 19    ALT (SGPT) 15    Albumin/Globulin Ratio 1.2    BUN/Creatinine Ratio 30.6    Anion Gap 15.1      CBC w/diff          6/2/2023    09:44   CBC w/Diff   WBC 8.78    RBC 5.57    Hemoglobin 12.6    Hematocrit 41.0    MCV 73.6    MCH 22.6    MCHC 30.7    RDW 18.4    Platelets 350    Neutrophil Rel % 82.2    Immature Granulocyte Rel % 0.9    Lymphocyte Rel % 8.1    Monocyte Rel % 7.3    Eosinophil Rel % 0.7    Basophil Rel % 0.8                      Assessment & Plan  Diagnoses and all orders for this visit:    1. S/P CABG (coronary artery bypass graft) (Primary)    2. Coronary artery disease involving native coronary artery of native heart without angina pectoris    3. Acute on chronic HFrEF (heart failure with reduced ejection fraction)    4. Mixed hyperlipidemia    5. Primary hypertension    Other orders  -     clopidogrel (PLAVIX) 75 MG tablet; Take 1 tablet by mouth Daily.  Dispense: 90 tablet; Refill: 2  -     losartan (Cozaar) 25 MG tablet; Take 0.5 tablets by mouth Daily.  Dispense: 30 tablet; Refill: 0  -     furosemide (LASIX) 20 MG tablet; Take 1 tablet by mouth Daily.  Dispense: 90 tablet; Refill: 3    1.  Status post CABG x3 vessels on 6/5/2023.  Doing well.  Denies any pain.  Without angina or anginal-like symptoms.  Tolerating his medications without any side effects.  Continue aspirin, Plavix, beta-blocker, statin.  2.  Without angina or anginal-like symptoms.  As above.  3.  Newly diagnosed  heart failure with reduced ejection fraction.  Bilateral lower extremity edema is noted on exam.  Lungs are clear.  Most recent ejection fraction was 35% by echocardiogram.  Continue with LifeVest.  He will be started on losartan 12.5 mg daily and Lasix 20 mg daily.  He was advised to closely monitor his blood pressure over the next week.  If he tolerates this we will consider switching losartan to Entresto at next follow-up visit.  Continue Farxiga.  Plan to recheck an echocardiogram in 6 to 8 weeks.  Advise daily blood pressure monitoring and daily weights.  4.  Continue statin therapy.  5.  Currently with marginal blood pressures.  Medication changes as above.  Home BP monitoring is advised.    Follow Up   Return in about 1 week (around 6/26/2023) for With , With LINNETTE Montez.    Patient was given instructions and counseling regarding his condition or for health maintenance advice. Please see specific information pulled into the AVS if appropriate.     LINNETTE Blair  06/19/23  20:33 EDT    Dictated Utilizing Dragon Dictation

## 2023-06-19 ENCOUNTER — OFFICE VISIT (OUTPATIENT)
Dept: CARDIOLOGY | Facility: CLINIC | Age: 73
End: 2023-06-19
Payer: MEDICARE

## 2023-06-19 VITALS
HEART RATE: 76 BPM | DIASTOLIC BLOOD PRESSURE: 60 MMHG | HEIGHT: 72 IN | BODY MASS INDEX: 24.92 KG/M2 | WEIGHT: 184 LBS | SYSTOLIC BLOOD PRESSURE: 114 MMHG

## 2023-06-19 DIAGNOSIS — I25.10 CORONARY ARTERY DISEASE INVOLVING NATIVE CORONARY ARTERY OF NATIVE HEART WITHOUT ANGINA PECTORIS: ICD-10-CM

## 2023-06-19 DIAGNOSIS — Z95.1 S/P CABG (CORONARY ARTERY BYPASS GRAFT): Primary | ICD-10-CM

## 2023-06-19 DIAGNOSIS — I10 PRIMARY HYPERTENSION: ICD-10-CM

## 2023-06-19 DIAGNOSIS — I50.23 ACUTE ON CHRONIC HFREF (HEART FAILURE WITH REDUCED EJECTION FRACTION): ICD-10-CM

## 2023-06-19 DIAGNOSIS — E78.2 MIXED HYPERLIPIDEMIA: ICD-10-CM

## 2023-06-19 RX ORDER — LOSARTAN POTASSIUM 25 MG/1
12.5 TABLET ORAL DAILY
Qty: 30 TABLET | Refills: 0 | Status: SHIPPED | OUTPATIENT
Start: 2023-06-19

## 2023-06-19 RX ORDER — FUROSEMIDE 20 MG/1
20 TABLET ORAL DAILY
Qty: 90 TABLET | Refills: 3 | Status: SHIPPED | OUTPATIENT
Start: 2023-06-19

## 2023-06-19 RX ORDER — LANOLIN ALCOHOL/MO/W.PET/CERES
1 CREAM (GRAM) TOPICAL EVERY 12 HOURS SCHEDULED
COMMUNITY
Start: 2023-06-13

## 2023-06-19 RX ORDER — CLOPIDOGREL BISULFATE 75 MG/1
1 TABLET ORAL DAILY
COMMUNITY
Start: 2023-06-13 | End: 2023-06-19 | Stop reason: SDUPTHER

## 2023-06-19 RX ORDER — AMOXICILLIN 250 MG
1 CAPSULE ORAL DAILY
COMMUNITY

## 2023-06-19 RX ORDER — ATORVASTATIN CALCIUM 80 MG/1
80 TABLET, FILM COATED ORAL
COMMUNITY
Start: 2023-06-13

## 2023-06-19 RX ORDER — INSULIN GLARGINE 100 [IU]/ML
INJECTION, SOLUTION SUBCUTANEOUS DAILY PRN
COMMUNITY
Start: 2023-06-13

## 2023-06-19 RX ORDER — CLOPIDOGREL BISULFATE 75 MG/1
75 TABLET ORAL DAILY
Qty: 90 TABLET | Refills: 2 | Status: SHIPPED | OUTPATIENT
Start: 2023-06-19

## 2023-06-19 RX ORDER — ASPIRIN 81 MG/1
81 TABLET ORAL DAILY
COMMUNITY

## 2023-06-30 ENCOUNTER — TELEPHONE (OUTPATIENT)
Dept: CARDIOLOGY | Facility: CLINIC | Age: 73
End: 2023-06-30

## 2023-06-30 NOTE — TELEPHONE ENCOUNTER
Caller: CLAUDIO LEE    Relationship: Emergency Contact    Best call back number: [phone]    What orders are you requesting (i.e. lab or imaging): LABS    In what timeframe would the patient need to come in: ASAP FOR APPT ON 7.6.23    Where will you receive your lab/imaging services: [facility]    Additional notes: THEY STATED THAT THEY HADN'T RECEIVED THE LABS ORDER.

## 2023-08-15 RX ORDER — LOSARTAN POTASSIUM 25 MG/1
TABLET ORAL
Qty: 30 TABLET | Refills: 0 | Status: SHIPPED | OUTPATIENT
Start: 2023-08-15

## 2023-08-31 ENCOUNTER — OFFICE VISIT (OUTPATIENT)
Dept: CARDIAC REHAB | Facility: HOSPITAL | Age: 73
End: 2023-08-31
Payer: MEDICARE

## 2023-08-31 VITALS
BODY MASS INDEX: 24.67 KG/M2 | DIASTOLIC BLOOD PRESSURE: 80 MMHG | OXYGEN SATURATION: 97 % | HEART RATE: 72 BPM | SYSTOLIC BLOOD PRESSURE: 150 MMHG | WEIGHT: 181.88 LBS

## 2023-08-31 DIAGNOSIS — Z95.1 S/P CABG (CORONARY ARTERY BYPASS GRAFT): Primary | ICD-10-CM

## 2023-08-31 PROCEDURE — 93798 PHYS/QHP OP CAR RHAB W/ECG: CPT

## 2023-08-31 RX ORDER — FERROUS SULFATE 325(65) MG
325 TABLET ORAL
COMMUNITY

## 2023-08-31 RX ORDER — METOPROLOL SUCCINATE 25 MG/1
25 TABLET, EXTENDED RELEASE ORAL DAILY
COMMUNITY

## 2023-08-31 NOTE — PROGRESS NOTES
Chief Complaint  Cardiac Rehab Phase II    Woodrow Garcia presents to [facility] CARDIOPULMONARY REHABILITATION    Physical Exam  Cardiovascular:      Rate and Rhythm: Normal rate and regular rhythm.      Pulses: Normal pulses.      Heart sounds: Normal heart sounds.   Pulmonary:      Effort: Pulmonary effort is normal.      Breath sounds: Normal breath sounds.   Abdominal:      General: Abdomen is flat.      Palpations: Abdomen is soft.   Musculoskeletal:         General: Normal range of motion.      Cervical back: Normal range of motion.      Comments: No swelling noted   Skin:     General: Skin is warm and dry.   Neurological:      Mental Status: He is alert and oriented to person, place, and time.   Psychiatric:         Mood and Affect: Mood normal.         Behavior: Behavior normal.      Comments: PHQ-9 at 0  [place] at 13      Result Review :          Assessment and Plan    Diagnoses and all orders for this visit:    1. S/P CABG (coronary artery bypass graft) (Primary)        Natty Lujan RN     See scanned notes attached.

## 2023-08-31 NOTE — PROGRESS NOTES
Phase II and III Education    Discipline Teaching:  Cardiac Rehab Phase II [x]      Cardiac Rehab Phase III []      Pulmonary Rehab II []      Pulmonary Rehab III []      Peripheral Artery Disease []        Class Given:  Asthma Management []      Beginners Cardiac Rehab []      Beginners Pulmonary Rehab []      CAD I []      CAD II []      CHF []      Diabetes Mellitus []     Diet & Cholesterol []     Diet Consult []      Energy Conservation []     Exercise []     Grocery Store Tour []     Harmonica Therapy []     High Blood Pressure []     Living with COPD []     Medication Safety []     Peripheral Artery Disease []     S & S of Infection []      Stress []        Education Topics:  Angina []      Arrhythmias []      Asthma Management []      Beginning Cardiac Rehab [x]      Blood Pressure [x]     Blood Sugar [x]     Breathing Retrainment []     CHF []     Cooking []     Daily Weight [x]     Diabetes Mellitus [x]      Diet [x]     Energy Conservation []     Exercise [x]      Foot Care []      Grocery Store Tour []      Heart Disease []      Heart Function [x]      Incision Care []     Living with COPD []     Medication Safety []     PAD Symptoms/Treatment []     Reconditioning Exercises []     S & S Infection []     Smoking Consult []     Stress Management []     Test Results []       Teaching Aids:  Video [x]      PAD Handout []      Pulmonary Workbook []      CAD Teaching Packet []      Stress Teaching Packet []     Exercise Teaching Packet [x]      Diet Teaching Packet []      CHF Teaching Packet []      Blood Pressure Teaching Packet []      Smoking Cessation Packet []      Medication Safety Handout []      Pflex Training []      Diabetes Teaching Packet []      Harmonica Therapy Packet []        Teaching Method:  Discussion [x]      Written Information [x]      Audio Visual [x]      Demonstration []        Teaching Recipient:  Patient [x]      Family []      Friend []      Legal Guardian []      Primary Care  Giver []      Significant Other []        Barriers To Learning:  None [x]      Auditory []      Cultural []      Emotional []      Financial []      Language []    Mental []      Motivation []      Physical []      Reading Skills []      Anglican []      Verbal/Cognitive []      Visual []      Written/Cognitive []        Teaching Response:  Verified Via Teach back []      Return Demo Via Teach Back []      Reinforcement Needed []      Unable to Return Demo []      Unable to Comprehend []      Declines Teaching  []        Additional Education Topics:  Discussed with patient and wife about Lasix on med list and not potassium , encouraged to discuss with  Physician about potassium use. Verbalized understanding.   Follow Up Instruction Comment:  Discussed program and encouraged at least 12 visit for graduation, encouraged to bring in Lipid Panel for   Discussion. Reviewed program levels of lipid panel and A1C

## 2023-09-05 ENCOUNTER — TREATMENT (OUTPATIENT)
Dept: CARDIAC REHAB | Facility: HOSPITAL | Age: 73
End: 2023-09-05
Payer: MEDICARE

## 2023-09-05 DIAGNOSIS — Z95.1 S/P CABG (CORONARY ARTERY BYPASS GRAFT): Primary | ICD-10-CM

## 2023-09-05 PROCEDURE — 93798 PHYS/QHP OP CAR RHAB W/ECG: CPT

## 2023-09-07 ENCOUNTER — TREATMENT (OUTPATIENT)
Dept: CARDIAC REHAB | Facility: HOSPITAL | Age: 73
End: 2023-09-07
Payer: MEDICARE

## 2023-09-07 DIAGNOSIS — Z95.1 S/P CABG (CORONARY ARTERY BYPASS GRAFT): Primary | ICD-10-CM

## 2023-09-07 PROCEDURE — 93798 PHYS/QHP OP CAR RHAB W/ECG: CPT

## 2023-09-12 ENCOUNTER — TREATMENT (OUTPATIENT)
Dept: CARDIAC REHAB | Facility: HOSPITAL | Age: 73
End: 2023-09-12
Payer: MEDICARE

## 2023-09-12 DIAGNOSIS — Z95.1 S/P CABG (CORONARY ARTERY BYPASS GRAFT): Primary | ICD-10-CM

## 2023-09-12 PROCEDURE — 93798 PHYS/QHP OP CAR RHAB W/ECG: CPT

## 2023-09-14 ENCOUNTER — TREATMENT (OUTPATIENT)
Dept: CARDIAC REHAB | Facility: HOSPITAL | Age: 73
End: 2023-09-14
Payer: MEDICARE

## 2023-09-14 DIAGNOSIS — Z95.1 S/P CABG (CORONARY ARTERY BYPASS GRAFT): Primary | ICD-10-CM

## 2023-09-14 PROCEDURE — 93798 PHYS/QHP OP CAR RHAB W/ECG: CPT

## 2023-09-19 ENCOUNTER — TREATMENT (OUTPATIENT)
Dept: CARDIAC REHAB | Facility: HOSPITAL | Age: 73
End: 2023-09-19
Payer: MEDICARE

## 2023-09-19 DIAGNOSIS — Z95.1 S/P CABG (CORONARY ARTERY BYPASS GRAFT): Primary | ICD-10-CM

## 2023-09-19 PROCEDURE — 93798 PHYS/QHP OP CAR RHAB W/ECG: CPT

## 2023-09-21 ENCOUNTER — TREATMENT (OUTPATIENT)
Dept: CARDIAC REHAB | Facility: HOSPITAL | Age: 73
End: 2023-09-21
Payer: MEDICARE

## 2023-09-21 DIAGNOSIS — Z95.1 S/P CABG (CORONARY ARTERY BYPASS GRAFT): Primary | ICD-10-CM

## 2023-09-21 PROCEDURE — 93798 PHYS/QHP OP CAR RHAB W/ECG: CPT

## 2023-09-26 ENCOUNTER — TREATMENT (OUTPATIENT)
Dept: CARDIAC REHAB | Facility: HOSPITAL | Age: 73
End: 2023-09-26
Payer: MEDICARE

## 2023-09-26 DIAGNOSIS — Z95.1 S/P CABG (CORONARY ARTERY BYPASS GRAFT): Primary | ICD-10-CM

## 2023-09-26 PROCEDURE — 93798 PHYS/QHP OP CAR RHAB W/ECG: CPT

## 2023-09-28 ENCOUNTER — TREATMENT (OUTPATIENT)
Dept: CARDIAC REHAB | Facility: HOSPITAL | Age: 73
End: 2023-09-28
Payer: MEDICARE

## 2023-09-28 DIAGNOSIS — Z95.1 S/P CABG (CORONARY ARTERY BYPASS GRAFT): Primary | ICD-10-CM

## 2023-09-28 PROCEDURE — 93798 PHYS/QHP OP CAR RHAB W/ECG: CPT

## 2023-10-03 ENCOUNTER — APPOINTMENT (OUTPATIENT)
Dept: CARDIAC REHAB | Facility: HOSPITAL | Age: 73
End: 2023-10-03
Payer: MEDICARE

## 2023-10-05 ENCOUNTER — APPOINTMENT (OUTPATIENT)
Dept: CARDIAC REHAB | Facility: HOSPITAL | Age: 73
End: 2023-10-05
Payer: MEDICARE

## 2023-10-10 ENCOUNTER — TREATMENT (OUTPATIENT)
Dept: CARDIAC REHAB | Facility: HOSPITAL | Age: 73
End: 2023-10-10
Payer: MEDICARE

## 2023-10-10 DIAGNOSIS — Z95.1 S/P CABG (CORONARY ARTERY BYPASS GRAFT): Primary | ICD-10-CM

## 2023-10-10 PROCEDURE — 93798 PHYS/QHP OP CAR RHAB W/ECG: CPT

## 2023-10-12 ENCOUNTER — TREATMENT (OUTPATIENT)
Dept: CARDIAC REHAB | Facility: HOSPITAL | Age: 73
End: 2023-10-12
Payer: MEDICARE

## 2023-10-12 DIAGNOSIS — Z95.1 S/P CABG (CORONARY ARTERY BYPASS GRAFT): Primary | ICD-10-CM

## 2023-10-12 PROCEDURE — 93798 PHYS/QHP OP CAR RHAB W/ECG: CPT

## 2023-10-12 RX ORDER — LOSARTAN POTASSIUM 25 MG/1
TABLET ORAL
Qty: 45 TABLET | Refills: 0 | Status: SHIPPED | OUTPATIENT
Start: 2023-10-12

## 2023-10-17 ENCOUNTER — APPOINTMENT (OUTPATIENT)
Dept: CARDIAC REHAB | Facility: HOSPITAL | Age: 73
End: 2023-10-17
Payer: MEDICARE

## 2023-10-18 ENCOUNTER — OFFICE VISIT (OUTPATIENT)
Dept: CARDIOLOGY | Facility: CLINIC | Age: 73
End: 2023-10-18
Payer: MEDICARE

## 2023-10-18 VITALS
BODY MASS INDEX: 26.06 KG/M2 | HEIGHT: 72 IN | WEIGHT: 192.4 LBS | DIASTOLIC BLOOD PRESSURE: 75 MMHG | HEART RATE: 80 BPM | SYSTOLIC BLOOD PRESSURE: 120 MMHG

## 2023-10-18 DIAGNOSIS — I10 ESSENTIAL HYPERTENSION: ICD-10-CM

## 2023-10-18 DIAGNOSIS — I50.22 CHRONIC HFREF (HEART FAILURE WITH REDUCED EJECTION FRACTION): ICD-10-CM

## 2023-10-18 DIAGNOSIS — I25.10 CORONARY ARTERY DISEASE INVOLVING NATIVE CORONARY ARTERY OF NATIVE HEART WITHOUT ANGINA PECTORIS: Primary | ICD-10-CM

## 2023-10-18 DIAGNOSIS — E78.2 MIXED DYSLIPIDEMIA: ICD-10-CM

## 2023-10-18 DIAGNOSIS — Z95.1 S/P CABG (CORONARY ARTERY BYPASS GRAFT): ICD-10-CM

## 2023-10-18 DIAGNOSIS — R93.3 ABNORMAL FINDINGS ON DIAGNOSTIC IMAGING OF OTHER PARTS OF DIGESTIVE TRACT: ICD-10-CM

## 2023-10-18 DIAGNOSIS — Z86.711 HISTORY OF PULMONARY EMBOLISM: ICD-10-CM

## 2023-10-18 NOTE — PROGRESS NOTES
Chief Complaint  Acute on chronic HFrEF (heart failure with reduced ejection and S/P CABG (coronary artery bypass graft)    Subjective      Patient is here for follow-up on CAD and hypertension.  He is status post CABG last June.  About 3 to 4 weeks after surgery, he had bilateral PE and was treated with catheter directed tPA.  He has been doing well since then.  His shortness of breath continues to improve.  He has no chest pain, palpitations, dizziness, presyncope or syncope.  He has [place] without extraordinary limitations.  He continues to be on Eliquis without bleeding or other side effects.    Past Medical History:   Diagnosis Date    Coronary artery disease     Diabetes     Enlarged prostate     Hyperlipidemia     Hypertension     Pulmonary embolism          Current Outpatient Medications:     apixaban (ELIQUIS) 5 MG tablet tablet, Take 1 tablet by mouth Every 12 (Twelve) Hours., Disp: 60 tablet, Rfl: 11    atorvastatin (LIPITOR) 80 MG tablet, Take 1 tablet by mouth every night at bedtime., Disp: , Rfl:     clopidogrel (PLAVIX) 75 MG tablet, Take 1 tablet by mouth Daily., Disp: 90 tablet, Rfl: 2    Farxiga 10 MG tablet, Take 10 mg by mouth Daily., Disp: , Rfl:     ferrous sulfate 325 (65 FE) MG tablet, Take 1 tablet by mouth Daily With Breakfast., Disp: , Rfl:     furosemide (LASIX) 20 MG tablet, Take 1 tablet by mouth Daily., Disp: 90 tablet, Rfl: 3    glipizide (GLUCOTROL) 10 MG tablet, Take 2 tablets by mouth 2 (Two) Times a Day Before Meals., Disp: , Rfl:     losartan (COZAAR) 25 MG tablet, TAKE 1/2 TABLET BY MOUTH DAILY, Disp: 45 tablet, Rfl: 0    metFORMIN (GLUCOPHAGE) 1000 MG tablet, Take 1 tablet by mouth 2 (Two) Times a Day With Meals., Disp: , Rfl:     metoprolol succinate XL (TOPROL-XL) 25 MG 24 hr tablet, Take 1 tablet by mouth Daily., Disp: , Rfl:     tamsulosin (FLOMAX) 0.4 MG capsule 24 hr capsule, Take 1 capsule by mouth Daily., Disp: , Rfl:     Medications  "Discontinued During This Encounter   Medication Reason    acetaminophen (TYLENOL) 325 MG tablet *Therapy completed    aspirin 81 MG EC tablet *Therapy completed    HYDROcodone-acetaminophen (NORCO) 5-325 MG per tablet *Therapy completed    Lantus SoloStar 100 UNIT/ML injection pen *Therapy completed    Magnesium Oxide 400 (240 Mg) MG tablet *Therapy completed    metoprolol tartrate (LOPRESSOR) 25 MG tablet *Therapy completed    potassium chloride ER (K-TAB) 20 MEQ tablet controlled-release ER tablet *Therapy completed    sennosides-docusate (PERICOLACE) 8.6-50 MG per tablet *Therapy completed    apixaban (ELIQUIS) 5 MG tablet tablet Reorder     No Known Allergies     Social History     Tobacco Use    Smoking status: Never     Passive exposure: Never    Smokeless tobacco: Never   Vaping Use    Vaping Use: Never used   Substance Use Topics    Alcohol use: Yes     Comment: 1980 june stopped    Drug use: Never       Family History   Problem Relation Age of Onset    Diabetes Mother     Hypertension Father     Hyperlipidemia Father     Heart disease Father     Diabetes Sister     Diabetes Brother     Cancer Brother         Objective     /75   Pulse 80   Ht 182.9 cm (72.01\")   Wt 87.3 kg (192 lb 6.4 oz)   BMI 26.09 kg/m²       Physical Exam    General Appearance:   no acute distress  Alert and oriented x3  HENT:   lips not cyanotic  Atraumatic  Neck:  No jvd   supple  Respiratory:  no respiratory distress  normal breath sounds  no rales  Cardiovascular:  no S3, no S4   no murmur  no rub  Extremities  No cyanosis  lower extremity edema: none    Skin:   warm, dry  No rashes      Result Review :     proBNP   Date Value Ref Range Status   06/02/2023 1,339.0 (H) 0.0 - 900.0 pg/mL Final     CMP          6/2/2023    09:44 6/25/2023    14:57 6/25/2023    18:43   CMP   Glucose 257  104  109    BUN 26  19     Creatinine 0.85  0.65     EGFR 92.9  100.7     Sodium 135  139  136.5    Potassium 4.5  4.2     Chloride 98  103   " "  Calcium 9.5  8.5     Total Protein 7.4  6.0     Albumin 4.1  3.2     Globulin 3.3  2.8     Total Bilirubin 0.9  0.8     Alkaline Phosphatase 86  115     AST (SGOT) 19  19     ALT (SGPT) 15  12     Albumin/Globulin Ratio 1.2  1.1     BUN/Creatinine Ratio 30.6  29.2     Anion Gap 15.1  13.0       CBC w/diff          6/2/2023    09:44 6/25/2023    14:57   CBC w/Diff   WBC 8.78  13.50    RBC 5.57  4.88    Hemoglobin 12.6  11.1    Hematocrit 41.0  37.4    MCV 73.6  76.6    MCH 22.6  22.7    MCHC 30.7  29.7    RDW 18.4  17.9    Platelets 350  282    Neutrophil Rel % 82.2  90.7    Immature Granulocyte Rel % 0.9  0.7    Lymphocyte Rel % 8.1  3.2    Monocyte Rel % 7.3  4.5    Eosinophil Rel % 0.7  0.4    Basophil Rel % 0.8  0.5       No results found for: \"TSH\"   No results found for: \"FREET4\"   D-Dimer, Quantitative   Date Value Ref Range Status   06/25/2023 13.02 (H) 0.00 - 0.71 MCGFEU/mL Final     Magnesium   Date Value Ref Range Status   06/25/2023 1.9 1.6 - 2.4 mg/dL Final      No results found for: \"DIGOXIN\"   Lab Results   Component Value Date    TROPONINT 50 (H) 06/25/2023             No results found for: \"POCTROP\"                   Diagnoses and all orders for this visit:    1. Coronary artery disease involving native coronary artery of native heart without angina pectoris (Primary)    2. History of pulmonary embolism  -     Lipid Panel; Future    3. Mixed dyslipidemia  -     apixaban (ELIQUIS) 5 MG tablet tablet; Take 1 tablet by mouth Every 12 (Twelve) Hours.  Dispense: 60 tablet; Refill: 11    4. Abnormal findings on diagnostic imaging of other parts of digestive tract  -     Lipid Panel; Future    5. Chronic HFrEF (heart failure with reduced ejection fraction)  -     Adult Transthoracic Echo Complete W/ Cont if Necessary Per Protocol; Future    6. S/P CABG (coronary artery bypass graft)      Assessment:     Coronary disease: Status post CABG x3 vessels on 6/5/2023.  He has been doing well without angina or " anginal-like symptoms.  Continue current medical therapy.  Lipid profile will be checked to ensure adequate control.    Ischemic cardiomyopathy: He has history of HFrEF with improvement of LVEF to 41% on the most recent echo.  He is euvolemic on today's exam.  Continue current medical therapy.  Repeat echo will be done to reevaluate LVEF.    Mixed dyslipidemia: As discussed above.    History of PE: He had pulm embolism few weeks after his bypass, likely provoked by reduced mobility.  He is status post EKOS.  He has been doing well without shortness of breath.  He is on Eliquis which will be continued for 6 to 12 months.    Essential hypertension: Stable on current regimen piquantly the same.      Follow Up     Return in about 6 months (around 4/18/2024) for with Dr Brannon.        Patient was given instructions and counseling regarding his condition or for health maintenance advice. Please see specific information pulled into the AVS if appropriate.

## 2023-10-19 ENCOUNTER — APPOINTMENT (OUTPATIENT)
Dept: CARDIAC REHAB | Facility: HOSPITAL | Age: 73
End: 2023-10-19
Payer: MEDICARE

## 2023-10-24 ENCOUNTER — APPOINTMENT (OUTPATIENT)
Dept: CARDIAC REHAB | Facility: HOSPITAL | Age: 73
End: 2023-10-24
Payer: MEDICARE

## 2023-10-26 ENCOUNTER — APPOINTMENT (OUTPATIENT)
Dept: CARDIAC REHAB | Facility: HOSPITAL | Age: 73
End: 2023-10-26
Payer: MEDICARE

## 2023-10-31 ENCOUNTER — APPOINTMENT (OUTPATIENT)
Dept: CARDIAC REHAB | Facility: HOSPITAL | Age: 73
End: 2023-10-31
Payer: MEDICARE

## 2024-01-09 RX ORDER — LOSARTAN POTASSIUM 25 MG/1
TABLET ORAL
Qty: 45 TABLET | Refills: 1 | Status: SHIPPED | OUTPATIENT
Start: 2024-01-09

## 2024-01-24 ENCOUNTER — LAB (OUTPATIENT)
Dept: LAB | Facility: HOSPITAL | Age: 74
End: 2024-01-24
Payer: MEDICARE

## 2024-01-24 DIAGNOSIS — Z86.711 HISTORY OF PULMONARY EMBOLISM: ICD-10-CM

## 2024-01-24 DIAGNOSIS — R93.3 ABNORMAL FINDINGS ON DIAGNOSTIC IMAGING OF OTHER PARTS OF DIGESTIVE TRACT: ICD-10-CM

## 2024-01-24 LAB
CHOLEST SERPL-MCNC: 96 MG/DL (ref 0–200)
HDLC SERPL-MCNC: 29 MG/DL (ref 40–60)
LDLC SERPL CALC-MCNC: 46 MG/DL (ref 0–100)
LDLC/HDLC SERPL: 1.54 {RATIO}
TRIGL SERPL-MCNC: 112 MG/DL (ref 0–150)
VLDLC SERPL-MCNC: 21 MG/DL (ref 5–40)

## 2024-01-24 PROCEDURE — 36415 COLL VENOUS BLD VENIPUNCTURE: CPT

## 2024-01-24 PROCEDURE — 80061 LIPID PANEL: CPT

## 2024-04-18 ENCOUNTER — OFFICE VISIT (OUTPATIENT)
Dept: CARDIOLOGY | Facility: CLINIC | Age: 74
End: 2024-04-18
Payer: MEDICARE

## 2024-04-18 VITALS
WEIGHT: 196.4 LBS | DIASTOLIC BLOOD PRESSURE: 73 MMHG | BODY MASS INDEX: 26.6 KG/M2 | SYSTOLIC BLOOD PRESSURE: 125 MMHG | HEART RATE: 64 BPM | HEIGHT: 72 IN

## 2024-04-18 DIAGNOSIS — I50.22 CHRONIC HFREF (HEART FAILURE WITH REDUCED EJECTION FRACTION): Primary | ICD-10-CM

## 2024-04-18 DIAGNOSIS — I25.10 CORONARY ARTERY DISEASE INVOLVING NATIVE CORONARY ARTERY OF NATIVE HEART WITHOUT ANGINA PECTORIS: ICD-10-CM

## 2024-04-18 DIAGNOSIS — E78.2 MIXED DYSLIPIDEMIA: ICD-10-CM

## 2024-04-18 DIAGNOSIS — I10 ESSENTIAL HYPERTENSION: ICD-10-CM

## 2024-04-18 DIAGNOSIS — Z86.711 HISTORY OF PULMONARY EMBOLISM: ICD-10-CM

## 2024-04-18 DIAGNOSIS — Z95.1 S/P CABG (CORONARY ARTERY BYPASS GRAFT): ICD-10-CM

## 2024-04-18 NOTE — PROGRESS NOTES
Chief Complaint  Coronary artery disease involving native coronary artery of, Acute on chronic HFrEF (heart failure with reduced ejection, and S/P CABG (coronary artery bypass graft)    Subjective      Patient is here for follow-up on CAD and hypertension. He is status post CABG June 2023. About 3 to 4 weeks after surgery, he had bilateral PE and was treated with catheter directed tPA. He has been doing well since then.  He has no complaints or concerns today.  He remains physically active without extraordinary limitations.  He has no chest discomfort or dyspnea.  He has no palpitations, dizziness, presyncope or syncope.  He continues to be on Eliquis without bleeding or other side effects.     Past Medical History:   Diagnosis Date    Coronary artery disease     Diabetes     Enlarged prostate     Hyperlipidemia     Hypertension     Pulmonary embolism          Current Outpatient Medications:     apixaban (ELIQUIS) 5 MG tablet tablet, Take 1 tablet by mouth Every 12 (Twelve) Hours., Disp: 60 tablet, Rfl: 11    atorvastatin (LIPITOR) 80 MG tablet, Take 1 tablet by mouth every night at bedtime., Disp: , Rfl:     clopidogrel (PLAVIX) 75 MG tablet, Take 1 tablet by mouth Daily., Disp: 90 tablet, Rfl: 2    Farxiga 10 MG tablet, Take 10 mg by mouth Daily., Disp: , Rfl:     ferrous sulfate 325 (65 FE) MG tablet, Take 1 tablet by mouth Daily With Breakfast., Disp: , Rfl:     furosemide (LASIX) 20 MG tablet, Take 1 tablet by mouth Daily., Disp: 90 tablet, Rfl: 3    glipizide (GLUCOTROL) 10 MG tablet, Take 2 tablets by mouth 2 (Two) Times a Day Before Meals., Disp: , Rfl:     losartan (COZAAR) 25 MG tablet, TAKE 1/2 TABLET BY MOUTH DAILY, Disp: 45 tablet, Rfl: 1    metFORMIN (GLUCOPHAGE) 1000 MG tablet, Take 1 tablet by mouth 2 (Two) Times a Day With Meals., Disp: , Rfl:     metoprolol succinate XL (TOPROL-XL) 25 MG 24 hr tablet, Take 1 tablet by mouth Daily., Disp: , Rfl:     tamsulosin (FLOMAX) 0.4 MG capsule 24 hr capsule,  "Take 1 capsule by mouth Daily., Disp: , Rfl:     There are no discontinued medications.  No Known Allergies     Social History     Tobacco Use    Smoking status: Never     Passive exposure: Never    Smokeless tobacco: Never   Vaping Use    Vaping status: Never Used   Substance Use Topics    Alcohol use: Yes     Comment: 1980 june stopped    Drug use: Never       Family History   Problem Relation Age of Onset    Diabetes Mother     Hypertension Father     Hyperlipidemia Father     Heart disease Father     Diabetes Sister     Diabetes Brother     Cancer Brother         Objective     /73   Pulse 64   Ht 182.9 cm (72.01\")   Wt 89.1 kg (196 lb 6.4 oz)   BMI 26.63 kg/m²       Physical Exam    General Appearance:   no acute distress  Alert and oriented x3  HENT:   lips not cyanotic  Atraumatic  Neck:  No jvd   supple  Respiratory:  no respiratory distress  normal breath sounds  no rales  Cardiovascular:  no S3, no S4   no murmur  no rub  Extremities  No cyanosis  lower extremity edema: none    Skin:   warm, dry  No rashes      Result Review :     proBNP   Date Value Ref Range Status   06/02/2023 1,339.0 (H) 0.0 - 900.0 pg/mL Final     CMP          6/2/2023    09:44 6/25/2023    14:57 6/25/2023    18:43   CMP   Glucose 257  104  109    BUN 26  19     Creatinine 0.85  0.65     EGFR 92.9  100.7     Sodium 135  139  136.5    Potassium 4.5  4.2     Chloride 98  103     Calcium 9.5  8.5     Total Protein 7.4  6.0     Albumin 4.1  3.2     Globulin 3.3  2.8     Total Bilirubin 0.9  0.8     Alkaline Phosphatase 86  115     AST (SGOT) 19  19     ALT (SGPT) 15  12     Albumin/Globulin Ratio 1.2  1.1     BUN/Creatinine Ratio 30.6  29.2     Anion Gap 15.1  13.0       CBC w/diff          6/2/2023    09:44 6/25/2023    14:57   CBC w/Diff   WBC 8.78  13.50    RBC 5.57  4.88    Hemoglobin 12.6  11.1    Hematocrit 41.0  37.4    MCV 73.6  76.6    MCH 22.6  22.7    MCHC 30.7  29.7    RDW 18.4  17.9    Platelets 350  282  " "  Neutrophil Rel % 82.2  90.7    Immature Granulocyte Rel % 0.9  0.7    Lymphocyte Rel % 8.1  3.2    Monocyte Rel % 7.3  4.5    Eosinophil Rel % 0.7  0.4    Basophil Rel % 0.8  0.5       No results found for: \"TSH\"   No results found for: \"FREET4\"   D-Dimer, Quantitative   Date Value Ref Range Status   06/25/2023 13.02 (H) 0.00 - 0.71 MCGFEU/mL Final     Magnesium   Date Value Ref Range Status   06/25/2023 1.9 1.6 - 2.4 mg/dL Final      No results found for: \"DIGOXIN\"   Lab Results   Component Value Date    TROPONINT 50 (H) 06/25/2023           Lipid Panel          1/24/2024    10:12   Lipid Panel   Total Cholesterol 96    Triglycerides 112    HDL Cholesterol 29    VLDL Cholesterol 21    LDL Cholesterol  46    LDL/HDL Ratio 1.54      No results found for: \"POCTROP\"    Results for orders placed in visit on 11/02/23    Adult Transthoracic Echo Complete W/ Cont if Necessary Per Protocol    Interpretation Summary    Left ventricular systolic function is normal. Calculated left ventricular EF = 54.7%    Left ventricular wall thickness is consistent with mild posterior asymmetric hypertrophy.    The following left ventricular wall segments are hypokinetic: basal inferoseptal, mid inferoseptal, mid anteroseptal and basal inferoseptal.    Left ventricular diastolic function is consistent with (grade II w/high LAP) pseudonormalization.    Mildly reduced right ventricular systolic function noted.    The right ventricular cavity is mildly dilated.    The left atrial cavity is severely dilated.                 Diagnoses and all orders for this visit:    1. Chronic HFrEF (heart failure with reduced ejection fraction) (Primary)    2. Coronary artery disease involving native coronary artery of native heart without angina pectoris    3. History of pulmonary embolism    4. S/P CABG (coronary artery bypass graft)    5. Mixed dyslipidemia    6. Essential hypertension      Assessment:      Coronary disease: Status post CABG x3 vessels " on 6/5/2023.  He has been doing well without angina or anginal-like symptoms.  Continue current medical therapy.       Ischemic cardiomyopathy: He has history of HFrEF with normalization of LVEF on the most recent echo.  He is euvolemic on today's exam.  Continue current medical therapy.      Mixed dyslipidemia: LDL is 46.  Continue atorvastatin 80 mg daily.     History of PE: He had pulm embolism few weeks after his bypass, likely provoked by reduced mobility.  He is status post EKOS.  He has been doing well without shortness of breath.  He is on Eliquis which will be continued for 6 to 12 months.  He was advised to switch this medication to aspirin this July.     Essential hypertension: Stable on current regimen piquantly the same.      Follow Up     No follow-ups on file.        Patient was given instructions and counseling regarding his condition or for health maintenance advice. Please see specific information pulled into the AVS if appropriate.

## 2024-07-05 RX ORDER — LOSARTAN POTASSIUM 25 MG/1
TABLET ORAL
Qty: 45 TABLET | Refills: 1 | OUTPATIENT
Start: 2024-07-05

## 2024-07-08 RX ORDER — LOSARTAN POTASSIUM 25 MG/1
TABLET ORAL
Qty: 45 TABLET | Refills: 1 | Status: SHIPPED | OUTPATIENT
Start: 2024-07-08

## 2024-10-18 ENCOUNTER — OFFICE VISIT (OUTPATIENT)
Dept: CARDIOLOGY | Facility: CLINIC | Age: 74
End: 2024-10-18
Payer: MEDICARE

## 2024-10-18 VITALS
BODY MASS INDEX: 26.41 KG/M2 | SYSTOLIC BLOOD PRESSURE: 124 MMHG | WEIGHT: 195 LBS | HEIGHT: 72 IN | HEART RATE: 76 BPM | DIASTOLIC BLOOD PRESSURE: 71 MMHG

## 2024-10-18 DIAGNOSIS — I10 ESSENTIAL HYPERTENSION: ICD-10-CM

## 2024-10-18 DIAGNOSIS — I25.10 CORONARY ARTERY DISEASE INVOLVING NATIVE CORONARY ARTERY OF NATIVE HEART WITHOUT ANGINA PECTORIS: ICD-10-CM

## 2024-10-18 DIAGNOSIS — Z86.711 HISTORY OF PULMONARY EMBOLISM: ICD-10-CM

## 2024-10-18 DIAGNOSIS — E78.2 MIXED DYSLIPIDEMIA: ICD-10-CM

## 2024-10-18 DIAGNOSIS — I50.22 CHRONIC HFREF (HEART FAILURE WITH REDUCED EJECTION FRACTION): Primary | ICD-10-CM

## 2024-10-18 DIAGNOSIS — Z95.1 S/P CABG (CORONARY ARTERY BYPASS GRAFT): ICD-10-CM

## 2024-10-18 RX ORDER — SEMAGLUTIDE 0.68 MG/ML
INJECTION, SOLUTION SUBCUTANEOUS
COMMUNITY
Start: 2024-09-01

## 2024-10-18 NOTE — PROGRESS NOTES
Chief Complaint  Coronary Artery Disease (6 mo f/u)    Subjective        History of Present Illness  Woodrow Garcia presents to [facility] for follow up.   Patient is a 74-year-old male with past medical history outlined below, significant for coronary artery disease status post CABG in June 2023, hypertension, bilateral PE postoperatively who presents for follow-up.  He is doing well today.  He has no complaints or concerns.  He remains physically active without extraordinary limitations.  He has no chest discomfort or dyspnea, palpitations, dizziness, syncope or presyncope.  He stopped his Eliquis this summer and switched to aspirin and Plavix.    Past Medical History:   Diagnosis Date    Coronary artery disease     Diabetes     Enlarged prostate     Hyperlipidemia     Hypertension     Myocardial infarction     Pulmonary embolism        ALLERGY  No Known Allergies     Past Surgical History:   Procedure Laterality Date    ARTERIAL BYPASS SURGERY      CARDIAC CATHETERIZATION N/A 06/02/2023    Procedure: Left Heart Cath;  Surgeon: Pito Brannon MD;  Location: [facility] CATH INVASIVE LOCATION;  Service: Cardiology;  Laterality: N/A;    CARDIAC CATHETERIZATION N/A 06/02/2023    Procedure: Intra-Aortic Baloon Pump Insertion;  Surgeon: Pito Brannon MD;  Location: [facility] CATH INVASIVE LOCATION;  Service: Cardiology;  Laterality: N/A;    CORONARY ARTERY BYPASS GRAFT      EKOS CATHETER PLACEMENT Right         Social History     Socioeconomic History    Marital status:    Tobacco Use    Smoking status: Never     Passive exposure: Never    Smokeless tobacco: Never   Vaping Use    Vaping status: Never Used   Substance and Sexual Activity    Alcohol use: Not Currently     Comment: Quit over 40 years ago    Drug use: Never    Sexual activity: Defer       Family History   Problem Relation Age of Onset    Diabetes Mother     Hypertension Father     Hyperlipidemia Father     Heart disease  "Father     Diabetes Sister     Diabetes Brother     Cancer Brother         Current Outpatient Medications on File Prior to Visit   Medication Sig    aspirin 81 MG oral suspension     Ozempic, 0.25 or 0.5 MG/DOSE, 2 MG/3ML solution pen-injector     atorvastatin (LIPITOR) 80 MG tablet Take 1 tablet by mouth every night at bedtime.    clopidogrel (PLAVIX) 75 MG tablet Take 1 tablet by mouth Daily.    Farxiga 10 MG tablet Take 10 mg by mouth Daily.    furosemide (LASIX) 20 MG tablet Take 1 tablet by mouth Daily.    glipizide (GLUCOTROL) 10 MG tablet Take 2 tablets by mouth 2 (Two) Times a Day Before Meals.    losartan (COZAAR) 25 MG tablet TAKE 1/2 TABLET BY MOUTH DAILY    metFORMIN (GLUCOPHAGE) 1000 MG tablet Take 1 tablet by mouth 2 (Two) Times a Day With Meals.    metoprolol succinate XL (TOPROL-XL) 25 MG 24 hr tablet Take 1 tablet by mouth Daily.    tamsulosin (FLOMAX) 0.4 MG capsule 24 hr capsule Take 1 capsule by mouth Daily.    [DISCONTINUED] apixaban (ELIQUIS) 5 MG tablet tablet Take 1 tablet by mouth Every 12 (Twelve) Hours.    [DISCONTINUED] ferrous sulfate 325 (65 FE) MG tablet Take 1 tablet by mouth Daily With Breakfast.     No current facility-administered medications on file prior to visit.       Objective   Vitals:    10/18/24 1016   BP: 124/71   Pulse: 76   Weight: 88.5 kg (195 lb)   Height: 182.9 cm (72.01\")       Physical Exam  Constitutional:       General: He is awake. He is not in acute distress.     Appearance: Normal appearance.   HENT:      Head: Normocephalic.      Nose: Nose normal. No congestion.   Eyes:      Extraocular Movements: Extraocular movements intact.      Conjunctiva/sclera: Conjunctivae normal.      Pupils: Pupils are equal, round, and reactive to light.   Neck:      Thyroid: No thyromegaly.      Vascular: No JVD.   Cardiovascular:      Rate and Rhythm: Normal rate and regular rhythm.      Chest Wall: PMI is not displaced.      Pulses: Normal pulses.      Heart sounds: Normal heart " sounds, S1 normal and S2 normal. No murmur heard.     No friction rub. No gallop. No S3 or S4 sounds.   Pulmonary:      Effort: Pulmonary effort is normal.      Breath sounds: Normal breath sounds. No wheezing, rhonchi or rales.   Abdominal:      General: Bowel sounds are normal.      Palpations: Abdomen is soft.      Tenderness: There is no abdominal tenderness.   Musculoskeletal:      Cervical back: No tenderness.      Right lower leg: No edema.      Left lower leg: No edema.   Lymphadenopathy:      Cervical: No cervical adenopathy.   Skin:     General: Skin is warm and dry.      Capillary Refill: Capillary refill takes less than 2 seconds.      Coloration: Skin is not cyanotic.      Findings: No petechiae or rash.      Nails: There is no clubbing.   Neurological:      Mental Status: He is alert.   Psychiatric:         Mood and Affect: Mood normal.         Behavior: Behavior is cooperative.           Result Review     The following data was reviewed by LINNETTE Blair on 10/18/24.           Lipid Panel          1/24/2024    10:12   Lipid Panel   Total Cholesterol 96    Triglycerides 112    HDL Cholesterol 29    VLDL Cholesterol 21    LDL Cholesterol  46    LDL/HDL Ratio 1.54        Results for orders placed in visit on 11/02/23    Adult Transthoracic Echo Complete W/ Cont if Necessary Per Protocol    Interpretation Summary    Left ventricular systolic function is normal. Calculated left ventricular EF = 54.7%    Left ventricular wall thickness is consistent with mild posterior asymmetric hypertrophy.    The following left ventricular wall segments are hypokinetic: basal inferoseptal, mid inferoseptal, mid anteroseptal and basal inferoseptal.    Left ventricular diastolic function is consistent with (grade II w/high LAP) pseudonormalization.    Mildly reduced right ventricular systolic function noted.    The right ventricular cavity is mildly dilated.    The left atrial cavity is severely dilated.      No  results found for this or any previous visit.          Procedures  Assessment & Plan  Chronic HFrEF (heart failure with reduced ejection fraction)  Patient has a history of HFrEF with normalization of LVEF on most recent echocardiogram.  He is euvolemic on today's exam.  Continue current medical therapy.  S/P CABG (coronary artery bypass graft)  Patient notes no angina or anginal-like symptoms.  Continue aspirin and Plavix.  Coronary artery disease involving native coronary artery of native heart without angina pectoris  As above.  Mixed dyslipidemia  Continue statin therapy.   Lab Results   Component Value Date    LDL 46 01/24/2024       History of pulmonary embolism  Patient had a provoked pulmonary embolism postop CABG.  He was treated with EKOS and anticoagulated for 6 to 12 months.  Okay to discontinue Eliquis.  Essential hypertension  Blood pressure is well-controlled on current regimen which will be continued.  The medical services provided during this encounter are part of ongoing care related to this patient's single serious condition or complex condition.    Follow Up   Return in about 6 months (around 4/18/2025) for With Dr. Zamora.    Patient was given instructions and counseling regarding his condition or for health maintenance advice. Please see specific information pulled into the AVS if appropriate.     Trinidad Mathews, APRN  10/18/24  11:51 EDT    Dictated Utilizing Dragon Dictation

## 2025-04-21 ENCOUNTER — OFFICE VISIT (OUTPATIENT)
Dept: CARDIOLOGY | Facility: CLINIC | Age: 75
End: 2025-04-21
Payer: MEDICARE

## 2025-04-21 VITALS
HEART RATE: 65 BPM | BODY MASS INDEX: 38.09 KG/M2 | DIASTOLIC BLOOD PRESSURE: 77 MMHG | SYSTOLIC BLOOD PRESSURE: 131 MMHG | WEIGHT: 194 LBS | HEIGHT: 60 IN

## 2025-04-21 DIAGNOSIS — I25.10 CORONARY ARTERY DISEASE INVOLVING NATIVE CORONARY ARTERY OF NATIVE HEART WITHOUT ANGINA PECTORIS: ICD-10-CM

## 2025-04-21 DIAGNOSIS — I10 ESSENTIAL HYPERTENSION: ICD-10-CM

## 2025-04-21 DIAGNOSIS — E78.2 MIXED DYSLIPIDEMIA: ICD-10-CM

## 2025-04-21 DIAGNOSIS — Z86.711 HISTORY OF PULMONARY EMBOLISM: ICD-10-CM

## 2025-04-21 DIAGNOSIS — I50.22 CHRONIC HFREF (HEART FAILURE WITH REDUCED EJECTION FRACTION): Primary | ICD-10-CM

## 2025-04-21 PROCEDURE — 99214 OFFICE O/P EST MOD 30 MIN: CPT

## 2025-04-21 PROCEDURE — 1159F MED LIST DOCD IN RCRD: CPT

## 2025-04-21 PROCEDURE — G2211 COMPLEX E/M VISIT ADD ON: HCPCS

## 2025-04-21 PROCEDURE — 1160F RVW MEDS BY RX/DR IN RCRD: CPT

## 2025-04-21 NOTE — PROGRESS NOTES
Chief Complaint  Follow-up (6 month), Congestive Heart Failure, and Coronary Artery Disease    Subjective        History of Present Illness  Woodrow Garcia presents to [facility] CARDIOLOGY for follow up.   Patient is a 74-year-old male with past medical history outlined below, significant for coronary artery disease status post CABG in 2023, hypertension, bilateral PE postop, hyperlipidemia who presents for routine follow-up.  He reports he is doing very well.  He has no complaints or concerns.  He remains physically active without extraordinary limitations.  He specifically denies any chest pain or discomfort, dyspnea, palpitations, edema or syncope.    Past Medical History:   Diagnosis Date    Coronary artery disease     Diabetes     Enlarged prostate     Hyperlipidemia     Hypertension     Myocardial infarction     Pulmonary embolism        ALLERGY  No Known Allergies     Past Surgical History:   Procedure Laterality Date    ARTERIAL BYPASS SURGERY      CARDIAC CATHETERIZATION N/A 06/02/2023    Procedure: Left Heart Cath;  Surgeon: Pito Brannon MD;  Location: [facility] CATH INVASIVE LOCATION;  Service: Cardiology;  Laterality: N/A;    CARDIAC CATHETERIZATION N/A 06/02/2023    Procedure: Intra-Aortic Baloon Pump Insertion;  Surgeon: Pito Brannon MD;  Location: [facility] CATH INVASIVE LOCATION;  Service: Cardiology;  Laterality: N/A;    CORONARY ARTERY BYPASS GRAFT      EKOS CATHETER PLACEMENT Right     HAND SURGERY Right 04/01/2025    right hand . skin cancel removal        Social History     Socioeconomic History    Marital status:    Tobacco Use    Smoking status: Never     Passive exposure: Never    Smokeless tobacco: Never   Vaping Use    Vaping status: Never Used   Substance and Sexual Activity    Alcohol use: Not Currently     Comment: Quit over 40 years ago    Drug use: Never    Sexual activity: Defer       Family History   Problem Relation Age of Onset    Diabetes Mother      "Hypertension Father     Hyperlipidemia Father     Heart disease Father     Diabetes Sister     Diabetes Brother     Cancer Brother     Heart disease Brother         Current Outpatient Medications on File Prior to Visit   Medication Sig    atorvastatin (LIPITOR) 80 MG tablet Take 1 tablet by mouth every night at bedtime.    clopidogrel (PLAVIX) 75 MG tablet Take 1 tablet by mouth Daily.    Farxiga 10 MG tablet Take 10 mg by mouth Daily.    furosemide (LASIX) 20 MG tablet Take 1 tablet by mouth Daily.    glipizide (GLUCOTROL) 10 MG tablet Take 2 tablets by mouth 2 (Two) Times a Day Before Meals.    losartan (COZAAR) 25 MG tablet TAKE 1/2 TABLET BY MOUTH DAILY    metFORMIN (GLUCOPHAGE) 1000 MG tablet Take 1 tablet by mouth 2 (Two) Times a Day With Meals.    metoprolol succinate XL (TOPROL-XL) 25 MG 24 hr tablet Take 1 tablet by mouth Daily.    Ozempic, 0.25 or 0.5 MG/DOSE, 2 MG/3ML solution pen-injector     tamsulosin (FLOMAX) 0.4 MG capsule 24 hr capsule Take 1 capsule by mouth Daily.    [DISCONTINUED] aspirin 81 MG oral suspension      No current facility-administered medications on file prior to visit.       Objective   Vitals:    04/21/25 1046   BP: 131/77   Pulse: 65   Weight: 83 kg (182 lb 14.4 oz)   Height: 72 cm (28.35\")       Physical Exam  Constitutional:       General: He is awake. He is not in acute distress.     Appearance: Normal appearance.   HENT:      Head: Normocephalic.      Nose: Nose normal. No congestion.   Eyes:      Extraocular Movements: Extraocular movements intact.      Conjunctiva/sclera: Conjunctivae normal.      Pupils: Pupils are equal, round, and reactive to light.   Neck:      Thyroid: No thyromegaly.      Vascular: No JVD.   Cardiovascular:      Rate and Rhythm: Normal rate and regular rhythm.      Chest Wall: PMI is not displaced.      Pulses: Normal pulses.      Heart sounds: Normal heart sounds, S1 normal and S2 normal. No murmur heard.     No friction rub. No gallop. No S3 or S4 " sounds.   Pulmonary:      Effort: Pulmonary effort is normal.      Breath sounds: Normal breath sounds. No wheezing, rhonchi or rales.   Abdominal:      General: Bowel sounds are normal.      Palpations: Abdomen is soft.      Tenderness: There is no abdominal tenderness.   Musculoskeletal:      Cervical back: No tenderness.      Right lower leg: No edema.      Left lower leg: No edema.   Lymphadenopathy:      Cervical: No cervical adenopathy.   Skin:     General: Skin is warm and dry.      Capillary Refill: Capillary refill takes less than 2 seconds.      Coloration: Skin is not cyanotic.      Findings: No petechiae or rash.      Nails: There is no clubbing.   Neurological:      Mental Status: He is alert.   Psychiatric:         Mood and Affect: Mood normal.         Behavior: Behavior is cooperative.           Result Review     The following data was reviewed by LINNETTE Blair on 04/21/25.      CMP          3/17/2025    13:29   CMP   Total Protein 7.2       Albumin 4.5       Globulin 2.7       Total Bilirubin 1.3       Alkaline Phosphatase 78       AST (SGOT) 22       ALT (SGPT) 22          Details          This result is from an external source.                    Results for orders placed in visit on 11/02/23    Adult Transthoracic Echo Complete W/ Cont if Necessary Per Protocol    Interpretation Summary    Left ventricular systolic function is normal. Calculated left ventricular EF = 54.7%    Left ventricular wall thickness is consistent with mild posterior asymmetric hypertrophy.    The following left ventricular wall segments are hypokinetic: basal inferoseptal, mid inferoseptal, mid anteroseptal and basal inferoseptal.    Left ventricular diastolic function is consistent with (grade II w/high LAP) pseudonormalization.    Mildly reduced right ventricular systolic function noted.    The right ventricular cavity is mildly dilated.    The left atrial cavity is severely dilated.      No results found for  this or any previous visit.          Procedures      Assessment & Plan  Chronic HFrEF (heart failure with reduced ejection fraction)  History of HFrEF with normalization of LVEF on most recent echocardiogram.  Euvolemic on exam.  Continue current medical therapy and clinical monitoring.  Coronary artery disease involving native coronary artery of native heart without angina pectoris  Status post CABG.  Clinically stable with no angina or anginal-like symptoms.  Continue the Plavix.  Okay to discontinue aspirin.  Mixed dyslipidemia  LDL is at goal.  Continue statin therapy.  History of pulmonary embolism  Patient had a provoked pulmonary embolism postop CABG.  He was treated with EKOS and anticoagulated for 6 to 12 months.  Continue Plavix.  Essential hypertension  Blood pressure is very well-controlled on current hypertensive regimen which will be continued.                   The medical services provided during this encounter are part of ongoing care related to this patient's single serious condition or complex condition.  Follow Up   Return in about 6 months (around 10/21/2025) for With Dr. Zamora.    Patient was given instructions and counseling regarding his condition or for health maintenance advice. Please see specific information pulled into the AVS if appropriate.     Trinidad Mathews, LINNETTE  04/21/25  10:56 EDT    Dictated Utilizing Dragon Dictation

## (undated) DEVICE — GLIDESHEATH SLENDER STAINLESS STEEL KIT: Brand: GLIDESHEATH SLENDER

## (undated) DEVICE — BALN IAB SENSATION PLS F/O 50CC 8F 15CM SYS

## (undated) DEVICE — RADIFOCUS OPTITORQUE ANGIOGRAPHIC CATHETER: Brand: OPTITORQUE

## (undated) DEVICE — GW FC FLOP/TP .035 260CM 3MM

## (undated) DEVICE — KT INTRO MIC VSI SMOTH STFF 4F 40CM 7CM

## (undated) DEVICE — CATH LAB PACK: Brand: MEDLINE INDUSTRIES, INC.